# Patient Record
Sex: FEMALE | Race: WHITE | NOT HISPANIC OR LATINO | Employment: UNEMPLOYED | URBAN - METROPOLITAN AREA
[De-identification: names, ages, dates, MRNs, and addresses within clinical notes are randomized per-mention and may not be internally consistent; named-entity substitution may affect disease eponyms.]

---

## 2023-04-01 PROBLEM — N88.8 CERVICAL MASS: Status: ACTIVE | Noted: 2023-04-01

## 2023-04-01 PROBLEM — F11.20: Status: ACTIVE | Noted: 2023-04-01

## 2023-04-01 PROBLEM — F11.20 OPIOID DEPENDENCE ON MAINTENANCE AGONIST THERAPY, NO SYMPTOMS (MULTI): Status: ACTIVE | Noted: 2023-04-01

## 2023-04-01 PROBLEM — O09.529 AMA (ADVANCED MATERNAL AGE) MULTIGRAVIDA 35+ (HHS-HCC): Status: ACTIVE | Noted: 2023-04-01

## 2023-04-01 PROBLEM — F32.0 DEPRESSION, MAJOR, SINGLE EPISODE, MILD (CMS-HCC): Status: ACTIVE | Noted: 2023-04-01

## 2023-04-01 PROBLEM — R87.611 PAP SMEAR OF CERVIX WITH ASCUS, CANNOT EXCLUDE HGSIL: Status: ACTIVE | Noted: 2023-04-01

## 2023-04-01 PROBLEM — F99 INSOMNIA DUE TO OTHER MENTAL DISORDER: Status: ACTIVE | Noted: 2023-04-01

## 2023-04-01 PROBLEM — G89.18 POST-OP PAIN: Status: ACTIVE | Noted: 2023-04-01

## 2023-04-01 PROBLEM — N91.2 AMENORRHEA: Status: ACTIVE | Noted: 2023-04-01

## 2023-04-01 PROBLEM — O21.9 NAUSEA AND VOMITING IN PREGNANCY (HHS-HCC): Status: ACTIVE | Noted: 2023-04-01

## 2023-04-01 PROBLEM — O99.321: Status: ACTIVE | Noted: 2023-04-01

## 2023-04-01 PROBLEM — F17.200 NICOTINE DEPENDENCE: Status: ACTIVE | Noted: 2023-04-01

## 2023-04-01 PROBLEM — F51.05 INSOMNIA DUE TO OTHER MENTAL DISORDER: Status: ACTIVE | Noted: 2023-04-01

## 2023-04-01 PROBLEM — R87.612 LGSIL ON PAP SMEAR OF CERVIX: Status: ACTIVE | Noted: 2023-04-01

## 2023-04-01 PROBLEM — M25.50 PAIN, JOINT, MULTIPLE SITES: Status: ACTIVE | Noted: 2023-04-01

## 2023-04-01 PROBLEM — R87.810 CERVICAL HIGH RISK HPV (HUMAN PAPILLOMAVIRUS) TEST POSITIVE: Status: ACTIVE | Noted: 2023-04-01

## 2023-04-01 RX ORDER — BUPRENORPHINE HYDROCHLORIDE 8 MG/1
TABLET SUBLINGUAL
COMMUNITY
Start: 2020-04-27

## 2023-04-01 RX ORDER — ONDANSETRON 4 MG/1
4 TABLET, ORALLY DISINTEGRATING ORAL
COMMUNITY
Start: 2022-09-06 | End: 2023-04-03 | Stop reason: ALTCHOICE

## 2023-04-01 RX ORDER — BUPROPION HYDROCHLORIDE 150 MG/1
150 TABLET ORAL DAILY
COMMUNITY
Start: 2015-02-24 | End: 2023-04-02 | Stop reason: ALTCHOICE

## 2023-04-01 RX ORDER — OXYCODONE HYDROCHLORIDE 5 MG/1
5 CAPSULE ORAL
COMMUNITY
End: 2023-04-02 | Stop reason: ALTCHOICE

## 2023-04-01 RX ORDER — TIZANIDINE 2 MG/1
2 TABLET ORAL NIGHTLY
COMMUNITY
Start: 2014-06-24 | End: 2023-04-02 | Stop reason: ALTCHOICE

## 2023-04-02 PROBLEM — N88.8 CERVICAL MASS: Status: RESOLVED | Noted: 2023-04-01 | Resolved: 2023-04-02

## 2023-04-02 PROBLEM — O09.529 AMA (ADVANCED MATERNAL AGE) MULTIGRAVIDA 35+ (HHS-HCC): Status: RESOLVED | Noted: 2023-04-01 | Resolved: 2023-04-02

## 2023-04-02 PROBLEM — R87.810 CERVICAL HIGH RISK HPV (HUMAN PAPILLOMAVIRUS) TEST POSITIVE: Status: RESOLVED | Noted: 2023-04-01 | Resolved: 2023-04-02

## 2023-04-02 PROBLEM — R87.612 LGSIL ON PAP SMEAR OF CERVIX: Status: RESOLVED | Noted: 2023-04-01 | Resolved: 2023-04-02

## 2023-04-02 PROBLEM — O21.9 NAUSEA AND VOMITING IN PREGNANCY (HHS-HCC): Status: RESOLVED | Noted: 2023-04-01 | Resolved: 2023-04-02

## 2023-04-02 PROBLEM — C53.9: Status: ACTIVE | Noted: 2023-04-02

## 2023-04-02 PROBLEM — G89.18 POST-OP PAIN: Status: RESOLVED | Noted: 2023-04-01 | Resolved: 2023-04-02

## 2023-04-02 PROBLEM — R87.611 PAP SMEAR OF CERVIX WITH ASCUS, CANNOT EXCLUDE HGSIL: Status: RESOLVED | Noted: 2023-04-01 | Resolved: 2023-04-02

## 2023-04-02 RX ORDER — SERTRALINE HYDROCHLORIDE 50 MG/1
50 TABLET, FILM COATED ORAL DAILY
COMMUNITY
End: 2023-04-03 | Stop reason: ALTCHOICE

## 2023-04-03 ENCOUNTER — OFFICE VISIT (OUTPATIENT)
Dept: PRIMARY CARE | Facility: CLINIC | Age: 38
End: 2023-04-03
Payer: COMMERCIAL

## 2023-04-03 VITALS
HEART RATE: 99 BPM | WEIGHT: 175.8 LBS | DIASTOLIC BLOOD PRESSURE: 78 MMHG | TEMPERATURE: 97.8 F | HEIGHT: 65 IN | RESPIRATION RATE: 17 BRPM | SYSTOLIC BLOOD PRESSURE: 136 MMHG | BODY MASS INDEX: 29.29 KG/M2

## 2023-04-03 DIAGNOSIS — R63.5 WEIGHT GAIN: Primary | ICD-10-CM

## 2023-04-03 DIAGNOSIS — F11.20 OPIOID DEPENDENCE ON MAINTENANCE AGONIST THERAPY, NO SYMPTOMS (MULTI): ICD-10-CM

## 2023-04-03 DIAGNOSIS — F32.0 DEPRESSION, MAJOR, SINGLE EPISODE, MILD (CMS-HCC): ICD-10-CM

## 2023-04-03 DIAGNOSIS — C53.9: ICD-10-CM

## 2023-04-03 DIAGNOSIS — R53.83 OTHER FATIGUE: ICD-10-CM

## 2023-04-03 PROCEDURE — 99214 OFFICE O/P EST MOD 30 MIN: CPT | Performed by: FAMILY MEDICINE

## 2023-04-03 RX ORDER — VENLAFAXINE 75 MG/1
75 TABLET ORAL 2 TIMES DAILY
COMMUNITY
Start: 2023-03-27 | End: 2023-04-25 | Stop reason: SDUPTHER

## 2023-04-03 ASSESSMENT — ENCOUNTER SYMPTOMS
DECREASED CONCENTRATION: 1
ARTHRALGIAS: 1
POLYDIPSIA: 0
FATIGUE: 1
SHORTNESS OF BREATH: 0
ACTIVITY CHANGE: 0
DIAPHORESIS: 1
LOSS OF SENSATION IN FEET: 0
OCCASIONAL FEELINGS OF UNSTEADINESS: 0
POLYPHAGIA: 0
APPETITE CHANGE: 1
PALPITATIONS: 1
DEPRESSION: 0

## 2023-04-03 ASSESSMENT — PATIENT HEALTH QUESTIONNAIRE - PHQ9
SUM OF ALL RESPONSES TO PHQ9 QUESTIONS 1 AND 2: 2
10. IF YOU CHECKED OFF ANY PROBLEMS, HOW DIFFICULT HAVE THESE PROBLEMS MADE IT FOR YOU TO DO YOUR WORK, TAKE CARE OF THINGS AT HOME, OR GET ALONG WITH OTHER PEOPLE: SOMEWHAT DIFFICULT
1. LITTLE INTEREST OR PLEASURE IN DOING THINGS: SEVERAL DAYS
2. FEELING DOWN, DEPRESSED OR HOPELESS: SEVERAL DAYS

## 2023-04-03 ASSESSMENT — COLUMBIA-SUICIDE SEVERITY RATING SCALE - C-SSRS
1. IN THE PAST MONTH, HAVE YOU WISHED YOU WERE DEAD OR WISHED YOU COULD GO TO SLEEP AND NOT WAKE UP?: NO
2. HAVE YOU ACTUALLY HAD ANY THOUGHTS OF KILLING YOURSELF?: NO
6. HAVE YOU EVER DONE ANYTHING, STARTED TO DO ANYTHING, OR PREPARED TO DO ANYTHING TO END YOUR LIFE?: NO

## 2023-04-03 NOTE — ASSESSMENT & PLAN NOTE
Exacerbated by cancer diagnosis and miscarriage, seeing counselor. Consider medication change once labs back. No risk to self or others.

## 2023-04-03 NOTE — ASSESSMENT & PLAN NOTE
Laparotomy 11/18/2022, abdominal radical trqchelectomy, abdomnal cerclage, sentinel lymph nose disseaction and uterosacral ligament dissection. Has follow up appt 5/4 with oncology.

## 2023-04-03 NOTE — PROGRESS NOTES
Subjective   Patient ID: Marleni Syed is a 37 y.o. female who presents for Follow-up (Patient wants to discuss antidepressant medication also states that she thinks she have right toe fungus.).    On Venlafaxine 75 mg for Anxiety and major depression. Was on Sertraline for long time. Went off it because made her feel like a Zombie. Dr. Gunn started her on Venlafaxine. Has been on the same dose for a while. She has gained 30 lbs. She had a 16 week miscarriage then cancer diagnosis. She has gained about 30 lbs. She has not been able to drop her weight. She feels like she barely eats at all.     Hair is falling out and skin is drier than usual. Feels sluggish. Memory is ok. Little forgetful. Constipation.     She had surgery for cervical cancer but no chemo or homonal treatment.     She has been depressed since lost the baby, overthinking and seeing counselor. Not feeling hopeless. She is not suicidal. Worrying more than she should.            Current Outpatient Medications:     buprenorphine (Subtex) 8 mg, Take 1 tab SL 6 days per week then 3/4 1 day per week, Disp: , Rfl:     venlafaxine (Effexor) 75 mg tablet, Take 1 tablet (75 mg) by mouth in the morning and 1 tablet (75 mg) before bedtime., Disp: , Rfl:     Patient Active Problem List   Diagnosis    Generalized anxiety disorder    Depression, major, single episode, mild (CMS/HCC)    Insomnia due to other mental disorder    Nicotine dependence    Opioid dependence on maintenance agonist therapy, no symptoms (CMS/HCC)    Pain, joint, multiple sites    Squamous cell carcinoma of cervix, stage 1 (CMS/HCC)    Weight gain    Other fatigue         Review of Systems   Constitutional:  Positive for appetite change, diaphoresis and fatigue. Negative for activity change.   Respiratory:  Negative for shortness of breath.    Cardiovascular:  Positive for palpitations.   Endocrine: Positive for cold intolerance. Negative for heat intolerance, polydipsia, polyphagia and  "polyuria.   Musculoskeletal:  Positive for arthralgias.   Skin:  Negative for rash.   Psychiatric/Behavioral:  Positive for decreased concentration.        Objective   /78 (BP Location: Left arm, Patient Position: Sitting, BP Cuff Size: Adult)   Pulse 99   Temp 36.6 °C (97.8 °F)   Resp 17   Ht 1.651 m (5' 5\")   Wt 79.7 kg (175 lb 12.8 oz)   BMI 29.25 kg/m²     Physical Exam  Vitals reviewed.   Constitutional:       Appearance: Normal appearance.   Neck:      Comments: No thyromegaly.   Pulmonary:      Effort: Pulmonary effort is normal.   Skin:     General: Skin is warm and dry.   Neurological:      Mental Status: She is alert.   Psychiatric:         Mood and Affect: Mood normal.         Behavior: Behavior normal.         Thought Content: Thought content normal.         Judgment: Judgment normal.      Comments: Affect appropriate to content.          Assessment/Plan   Problem List Items Addressed This Visit          Genitourinary    Squamous cell carcinoma of cervix, stage 1 (CMS/HCC)     Laparotomy 11/18/2022, abdominal radical trqchelectomy, abdomnal cerclage, sentinel lymph nose disseaction and uterosacral ligament dissection. Has follow up appt 5/4 with oncology.             Endocrine/Metabolic    Weight gain - Primary     Etiology is unclear. Some of her symptoms are consistent with thyroid issues. Will check labs. She has some swelling in her joints of fingers. Check sed rate to screen for autoimmune disorder.             Other    Depression, major, single episode, mild (CMS/HCC)     Exacerbated by cancer diagnosis and miscarriage, seeing counselor. Consider medication change once labs back. No risk to self or others.          Opioid dependence on maintenance agonist therapy, no symptoms (CMS/HCC)     Gets MAT at [facility]. On Subutex. Currently abstinent for around 7 years.               Assessment, plans, tests, and follow up discussed with patient and patient verbalized understanding. Marleni was " given an opportunity to ask questions and  any concerns were addressed including but not limited to tests, follow up.

## 2023-04-03 NOTE — ASSESSMENT & PLAN NOTE
Etiology is unclear. Some of her symptoms are consistent with thyroid issues. Will check labs. She has some swelling in her joints of fingers. Check sed rate to screen for autoimmune disorder.

## 2023-04-14 ENCOUNTER — LAB (OUTPATIENT)
Dept: LAB | Facility: LAB | Age: 38
End: 2023-04-14
Payer: COMMERCIAL

## 2023-04-14 DIAGNOSIS — R53.83 OTHER FATIGUE: ICD-10-CM

## 2023-04-14 DIAGNOSIS — R63.5 WEIGHT GAIN: ICD-10-CM

## 2023-04-14 LAB
ALANINE AMINOTRANSFERASE (SGPT) (U/L) IN SER/PLAS: 9 U/L (ref 7–45)
ALBUMIN (G/DL) IN SER/PLAS: 4.6 G/DL (ref 3.4–5)
ALKALINE PHOSPHATASE (U/L) IN SER/PLAS: 53 U/L (ref 33–110)
ANION GAP IN SER/PLAS: 12 MMOL/L (ref 10–20)
ASPARTATE AMINOTRANSFERASE (SGOT) (U/L) IN SER/PLAS: 16 U/L (ref 9–39)
BILIRUBIN TOTAL (MG/DL) IN SER/PLAS: 0.3 MG/DL (ref 0–1.2)
CALCIUM (MG/DL) IN SER/PLAS: 9.2 MG/DL (ref 8.6–10.3)
CARBON DIOXIDE, TOTAL (MMOL/L) IN SER/PLAS: 25 MMOL/L (ref 21–32)
CHLORIDE (MMOL/L) IN SER/PLAS: 105 MMOL/L (ref 98–107)
CREATININE (MG/DL) IN SER/PLAS: 0.75 MG/DL (ref 0.5–1.05)
ERYTHROCYTE DISTRIBUTION WIDTH (RATIO) BY AUTOMATED COUNT: 16.5 % (ref 11.5–14.5)
ERYTHROCYTE MEAN CORPUSCULAR HEMOGLOBIN CONCENTRATION (G/DL) BY AUTOMATED: 29 G/DL (ref 32–36)
ERYTHROCYTE MEAN CORPUSCULAR VOLUME (FL) BY AUTOMATED COUNT: 71 FL (ref 80–100)
ERYTHROCYTES (10*6/UL) IN BLOOD BY AUTOMATED COUNT: 4.73 X10E12/L (ref 4–5.2)
GFR FEMALE: >90 ML/MIN/1.73M2
GLUCOSE (MG/DL) IN SER/PLAS: 92 MG/DL (ref 74–99)
HEMATOCRIT (%) IN BLOOD BY AUTOMATED COUNT: 33.4 % (ref 36–46)
HEMOGLOBIN (G/DL) IN BLOOD: 9.7 G/DL (ref 12–16)
LEUKOCYTES (10*3/UL) IN BLOOD BY AUTOMATED COUNT: 5.4 X10E9/L (ref 4.4–11.3)
PLATELETS (10*3/UL) IN BLOOD AUTOMATED COUNT: 338 X10E9/L (ref 150–450)
POTASSIUM (MMOL/L) IN SER/PLAS: 3.6 MMOL/L (ref 3.5–5.3)
PROTEIN TOTAL: 7.8 G/DL (ref 6.4–8.2)
SEDIMENTATION RATE, ERYTHROCYTE: 23 MM/H (ref 0–20)
SODIUM (MMOL/L) IN SER/PLAS: 138 MMOL/L (ref 136–145)
THYROTROPIN (MIU/L) IN SER/PLAS BY DETECTION LIMIT <= 0.05 MIU/L: 4.29 MIU/L (ref 0.44–3.98)
THYROXINE (T4) FREE (NG/DL) IN SER/PLAS: 0.58 NG/DL (ref 0.61–1.12)
UREA NITROGEN (MG/DL) IN SER/PLAS: 16 MG/DL (ref 6–23)

## 2023-04-14 PROCEDURE — 36415 COLL VENOUS BLD VENIPUNCTURE: CPT

## 2023-04-14 PROCEDURE — 80053 COMPREHEN METABOLIC PANEL: CPT

## 2023-04-14 PROCEDURE — 83036 HEMOGLOBIN GLYCOSYLATED A1C: CPT

## 2023-04-14 PROCEDURE — 85652 RBC SED RATE AUTOMATED: CPT

## 2023-04-14 PROCEDURE — 84439 ASSAY OF FREE THYROXINE: CPT

## 2023-04-14 PROCEDURE — 84443 ASSAY THYROID STIM HORMONE: CPT

## 2023-04-14 PROCEDURE — 85027 COMPLETE CBC AUTOMATED: CPT

## 2023-04-15 LAB
ESTIMATED AVERAGE GLUCOSE FOR HBA1C: 123 MG/DL
HEMOGLOBIN A1C/HEMOGLOBIN TOTAL IN BLOOD: 5.9 %

## 2023-04-17 ENCOUNTER — OFFICE VISIT (OUTPATIENT)
Dept: PRIMARY CARE | Facility: CLINIC | Age: 38
End: 2023-04-17
Payer: COMMERCIAL

## 2023-04-17 VITALS
HEART RATE: 99 BPM | BODY MASS INDEX: 29.76 KG/M2 | DIASTOLIC BLOOD PRESSURE: 75 MMHG | RESPIRATION RATE: 20 BRPM | WEIGHT: 178.6 LBS | HEIGHT: 65 IN | TEMPERATURE: 97.9 F | SYSTOLIC BLOOD PRESSURE: 115 MMHG | OXYGEN SATURATION: 98 %

## 2023-04-17 DIAGNOSIS — R53.83 OTHER FATIGUE: ICD-10-CM

## 2023-04-17 DIAGNOSIS — M25.50 PAIN, JOINT, MULTIPLE SITES: ICD-10-CM

## 2023-04-17 DIAGNOSIS — R73.09 ELEVATED HEMOGLOBIN A1C: ICD-10-CM

## 2023-04-17 DIAGNOSIS — E03.9 ACQUIRED HYPOTHYROIDISM: Primary | ICD-10-CM

## 2023-04-17 DIAGNOSIS — F32.0 DEPRESSION, MAJOR, SINGLE EPISODE, MILD (CMS-HCC): ICD-10-CM

## 2023-04-17 DIAGNOSIS — F41.1 GENERALIZED ANXIETY DISORDER: ICD-10-CM

## 2023-04-17 DIAGNOSIS — R63.5 WEIGHT GAIN: ICD-10-CM

## 2023-04-17 PROCEDURE — 99214 OFFICE O/P EST MOD 30 MIN: CPT | Performed by: FAMILY MEDICINE

## 2023-04-17 RX ORDER — LEVOTHYROXINE SODIUM 50 UG/1
50 TABLET ORAL
Qty: 30 TABLET | Refills: 11 | Status: SHIPPED | OUTPATIENT
Start: 2023-04-17 | End: 2023-07-13 | Stop reason: SDUPTHER

## 2023-04-17 ASSESSMENT — ENCOUNTER SYMPTOMS
CONFUSION: 0
UNEXPECTED WEIGHT CHANGE: 0
HEADACHES: 0
PALPITATIONS: 0
COUGH: 0
SHORTNESS OF BREATH: 0

## 2023-04-17 NOTE — ASSESSMENT & PLAN NOTE
A1C 5.9, prior 5.4, She had normal glucose. Does not meet criteria for Prediabetes. Repeat in 3-6 months to evaluate.

## 2023-04-17 NOTE — ASSESSMENT & PLAN NOTE
TSH elevated, T4 low. ESR elevated slightly. Suspect she may have Hashimotos. Will check antibodies when recheck TFTs on her Levothyroxine in 6 weeks.

## 2023-04-17 NOTE — PROGRESS NOTES
"Subjective   Patient ID: Marleni Syed is a 37 y.o. female who presents for Follow-up (2 week check and review labs).    Here for 4 week follow up     Having issues with Venlafaxine not working as well. On Venlafaxine ER.  She was feeling tired, achey.     Had sugar checked due to thirst. She has not had abnormal sugars.     She had surgery for cervical cancer. Had some transfusions. Hgfb was as low as 7.4 then up to 8.2. Repeated it an 9.7 with low MCV and MCHC. Has not been on iron.            Current Outpatient Medications:     buprenorphine (Subtex) 8 mg, Take 1 tab SL 6 days per week then 3/4 1 day per week, Disp: , Rfl:     venlafaxine (Effexor) 75 mg tablet, Take 1 tablet (75 mg) by mouth in the morning and 1 tablet (75 mg) before bedtime., Disp: , Rfl:     Patient Active Problem List   Diagnosis    Generalized anxiety disorder    Depression, major, single episode, mild (CMS/HCC)    Insomnia due to other mental disorder    Nicotine dependence    Opioid dependence on maintenance agonist therapy, no symptoms (CMS/HCC)    Pain, joint, multiple sites    Squamous cell carcinoma of cervix, stage 1 (CMS/HCC)    Weight gain    Other fatigue    Elevated hemoglobin A1c    Acquired hypothyroidism         Review of Systems   Constitutional:  Negative for unexpected weight change.   Respiratory:  Negative for cough and shortness of breath.    Cardiovascular:  Negative for chest pain, palpitations and leg swelling.   Skin:  Negative for rash.   Neurological:  Negative for headaches.   Psychiatric/Behavioral:  Negative for confusion.        Objective   /75 (BP Location: Right arm, Patient Position: Sitting)   Pulse 99   Temp 36.6 °C (97.9 °F)   Resp 20   Ht 1.651 m (5' 5\")   Wt 81 kg (178 lb 9.6 oz)   SpO2 98%   BMI 29.72 kg/m²     Physical Exam  Vitals reviewed.   Constitutional:       Appearance: Normal appearance.   Pulmonary:      Effort: Pulmonary effort is normal.   Neurological:      Mental Status: She " is alert.   Psychiatric:         Mood and Affect: Mood normal.         Behavior: Behavior normal.       Recent Results (from the past 672 hour(s))   CBC    Collection Time: 04/14/23  4:02 PM   Result Value Ref Range    WBC 5.4 4.4 - 11.3 x10E9/L    RBC 4.73 4.00 - 5.20 x10E12/L    Hemoglobin 9.7 (L) 12.0 - 16.0 g/dL    Hematocrit 33.4 (L) 36.0 - 46.0 %    MCV 71 (L) 80 - 100 fL    MCHC 29.0 (L) 32.0 - 36.0 g/dL    Platelets 338 150 - 450 x10E9/L    RDW 16.5 (H) 11.5 - 14.5 %   Comprehensive Metabolic Panel    Collection Time: 04/14/23  4:02 PM   Result Value Ref Range    Glucose 92 74 - 99 mg/dL    Sodium 138 136 - 145 mmol/L    Potassium 3.6 3.5 - 5.3 mmol/L    Chloride 105 98 - 107 mmol/L    Bicarbonate 25 21 - 32 mmol/L    Anion Gap 12 10 - 20 mmol/L    Urea Nitrogen 16 6 - 23 mg/dL    Creatinine 0.75 0.50 - 1.05 mg/dL    GFR Female >90 >90 mL/min/1.73m2    Calcium 9.2 8.6 - 10.3 mg/dL    Albumin 4.6 3.4 - 5.0 g/dL    Alkaline Phosphatase 53 33 - 110 U/L    Total Protein 7.8 6.4 - 8.2 g/dL    AST 16 9 - 39 U/L    Total Bilirubin 0.3 0.0 - 1.2 mg/dL    ALT (SGPT) 9 7 - 45 U/L   Hemoglobin A1C    Collection Time: 04/14/23  4:02 PM   Result Value Ref Range    Hemoglobin A1C 5.9 (A) %    Estimated Average Glucose 123 MG/DL   Sedimentation Rate    Collection Time: 04/14/23  4:02 PM   Result Value Ref Range    Sedimentation Rate 23 (H) 0 - 20 mm/h   Thyroxine, Free    Collection Time: 04/14/23  4:02 PM   Result Value Ref Range    Free T4 0.58 (L) 0.61 - 1.12 ng/dL   Thyroid Stimulating Hormone    Collection Time: 04/14/23  4:02 PM   Result Value Ref Range    TSH 4.29 (H) 0.44 - 3.98 mIU/L         Assessment/Plan   Problem List Items Addressed This Visit          Musculoskeletal    Pain, joint, multiple sites     Will treat thyroid then will reassess and work up if not improved.             Endocrine/Metabolic    Weight gain     Likely due to thyroid issues.          Acquired hypothyroidism     TSH elevated, T4 low. ESR  elevated slightly. Suspect she may have Hashimotos. Will check antibodies when recheck TFTs on her Levothyroxine in 6 weeks.             Hematologic    Elevated hemoglobin A1c     A1C 5.9, prior 5.4, She had normal glucose. Does not meet criteria for Prediabetes. Repeat in 3-6 months to evaluate.            Other    Generalized anxiety disorder     Will wait to address until euthyroid.          Depression, major, single episode, mild (CMS/HCC)     Sx have been worse on same dose but is hypothyroid. Will add Levothyroxine and titrate then readdress. No SI>          Other fatigue - Primary     Likely due to Hypothyroidism. Follow up after on Levothyroxine.               Assessment, plans, tests, and follow up discussed with patient and patient verbalized understanding. Marleni was given an opportunity to ask questions and  any concerns were addressed including but not limited to medication changes, diagnosis, and follow up plans. .

## 2023-04-17 NOTE — ASSESSMENT & PLAN NOTE
Sx have been worse on same dose but is hypothyroid. Will add Levothyroxine and titrate then readdress. No SI>

## 2023-04-25 ENCOUNTER — TELEPHONE (OUTPATIENT)
Dept: PRIMARY CARE | Facility: CLINIC | Age: 38
End: 2023-04-25

## 2023-04-25 DIAGNOSIS — F32.0 DEPRESSION, MAJOR, SINGLE EPISODE, MILD (CMS-HCC): Primary | ICD-10-CM

## 2023-04-25 NOTE — TELEPHONE ENCOUNTER
Refill:  Venlafaxine 75 MG taken twice a day     Pervious Visit:4/17/23    Future Visit: 5/17/23    Pharmacy: [facility]

## 2023-04-26 RX ORDER — VENLAFAXINE 75 MG/1
75 TABLET ORAL 2 TIMES DAILY
Qty: 180 TABLET | Refills: 0 | Status: SHIPPED | OUTPATIENT
Start: 2023-04-26 | End: 2023-07-13 | Stop reason: DRUGHIGH

## 2023-05-17 ENCOUNTER — TELEPHONE (OUTPATIENT)
Dept: PRIMARY CARE | Facility: CLINIC | Age: 38
End: 2023-05-17

## 2023-05-17 ENCOUNTER — APPOINTMENT (OUTPATIENT)
Dept: PRIMARY CARE | Facility: CLINIC | Age: 38
End: 2023-05-17

## 2023-05-17 NOTE — TELEPHONE ENCOUNTER
Patient called in and wanted to let you know that the levothyroxine so far has not been helping her  thyroid issues and is asking for advice on what she needs to do before her appointment on the first.

## 2023-05-31 PROBLEM — D50.0 ANEMIA DUE TO BLOOD LOSS: Status: ACTIVE | Noted: 2023-05-31

## 2023-05-31 ASSESSMENT — ENCOUNTER SYMPTOMS
NERVOUS/ANXIOUS: 0
VOMITING: 0
DYSPHORIC MOOD: 0
DIFFICULTY URINATING: 0
SHORTNESS OF BREATH: 0
DIARRHEA: 0
POLYPHAGIA: 0
JOINT SWELLING: 0
HEADACHES: 0
ACTIVITY CHANGE: 0
WHEEZING: 0
CONSTIPATION: 0
TROUBLE SWALLOWING: 0
CONFUSION: 0
SEIZURES: 0
ARTHRALGIAS: 0
ABDOMINAL PAIN: 0
POLYDIPSIA: 0
COUGH: 0
APPETITE CHANGE: 0
BLOOD IN STOOL: 0
UNEXPECTED WEIGHT CHANGE: 0
PALPITATIONS: 0
NAUSEA: 0

## 2023-06-01 ENCOUNTER — LAB (OUTPATIENT)
Dept: LAB | Facility: LAB | Age: 38
End: 2023-06-01
Payer: COMMERCIAL

## 2023-06-01 ENCOUNTER — OFFICE VISIT (OUTPATIENT)
Dept: PRIMARY CARE | Facility: CLINIC | Age: 38
End: 2023-06-01
Payer: COMMERCIAL

## 2023-06-01 VITALS
DIASTOLIC BLOOD PRESSURE: 73 MMHG | HEIGHT: 65 IN | BODY MASS INDEX: 28.69 KG/M2 | HEART RATE: 70 BPM | OXYGEN SATURATION: 98 % | WEIGHT: 172.2 LBS | TEMPERATURE: 97.3 F | SYSTOLIC BLOOD PRESSURE: 108 MMHG

## 2023-06-01 DIAGNOSIS — Z13.220 SCREENING, LIPID: ICD-10-CM

## 2023-06-01 DIAGNOSIS — E03.9 ACQUIRED HYPOTHYROIDISM: ICD-10-CM

## 2023-06-01 DIAGNOSIS — M25.50 PAIN, JOINT, MULTIPLE SITES: ICD-10-CM

## 2023-06-01 DIAGNOSIS — Z00.00 WELL ADULT EXAM: Primary | ICD-10-CM

## 2023-06-01 DIAGNOSIS — R73.09 ELEVATED HEMOGLOBIN A1C: ICD-10-CM

## 2023-06-01 DIAGNOSIS — F32.0 DEPRESSION, MAJOR, SINGLE EPISODE, MILD (CMS-HCC): ICD-10-CM

## 2023-06-01 DIAGNOSIS — D50.0 ANEMIA DUE TO BLOOD LOSS: ICD-10-CM

## 2023-06-01 DIAGNOSIS — C53.9: ICD-10-CM

## 2023-06-01 DIAGNOSIS — Z23 NEED FOR VACCINATION: ICD-10-CM

## 2023-06-01 DIAGNOSIS — F17.299 OTHER TOBACCO PRODUCT NICOTINE DEPENDENCE WITH NICOTINE-INDUCED DISORDER: ICD-10-CM

## 2023-06-01 DIAGNOSIS — E16.2 HYPOGLYCEMIA: ICD-10-CM

## 2023-06-01 DIAGNOSIS — F41.1 GENERALIZED ANXIETY DISORDER: ICD-10-CM

## 2023-06-01 LAB
ANION GAP IN SER/PLAS: 13 MMOL/L (ref 10–20)
BASOPHILS (10*3/UL) IN BLOOD BY AUTOMATED COUNT: 0.05 X10E9/L (ref 0–0.1)
BASOPHILS/100 LEUKOCYTES IN BLOOD BY AUTOMATED COUNT: 1.3 % (ref 0–2)
CALCIUM (MG/DL) IN SER/PLAS: 9.6 MG/DL (ref 8.6–10.3)
CARBON DIOXIDE, TOTAL (MMOL/L) IN SER/PLAS: 27 MMOL/L (ref 21–32)
CHLORIDE (MMOL/L) IN SER/PLAS: 103 MMOL/L (ref 98–107)
CHOLESTEROL (MG/DL) IN SER/PLAS: 163 MG/DL (ref 0–199)
CHOLESTEROL IN HDL (MG/DL) IN SER/PLAS: 44.9 MG/DL
CHOLESTEROL/HDL RATIO: 3.6
CREATININE (MG/DL) IN SER/PLAS: 0.69 MG/DL (ref 0.5–1.05)
EOSINOPHILS (10*3/UL) IN BLOOD BY AUTOMATED COUNT: 0.11 X10E9/L (ref 0–0.7)
EOSINOPHILS/100 LEUKOCYTES IN BLOOD BY AUTOMATED COUNT: 2.8 % (ref 0–6)
ERYTHROCYTE DISTRIBUTION WIDTH (RATIO) BY AUTOMATED COUNT: 18.6 % (ref 11.5–14.5)
ERYTHROCYTE MEAN CORPUSCULAR HEMOGLOBIN CONCENTRATION (G/DL) BY AUTOMATED: 28.5 G/DL (ref 32–36)
ERYTHROCYTE MEAN CORPUSCULAR VOLUME (FL) BY AUTOMATED COUNT: 74 FL (ref 80–100)
ERYTHROCYTES (10*6/UL) IN BLOOD BY AUTOMATED COUNT: 4.48 X10E12/L (ref 4–5.2)
ESTIMATED AVERAGE GLUCOSE FOR HBA1C: 123 MG/DL
FERRITIN (UG/LL) IN SER/PLAS: 15 UG/L (ref 8–150)
GFR FEMALE: >90 ML/MIN/1.73M2
GLUCOSE (MG/DL) IN SER/PLAS: 52 MG/DL (ref 74–99)
HEMATOCRIT (%) IN BLOOD BY AUTOMATED COUNT: 33.3 % (ref 36–46)
HEMOGLOBIN (G/DL) IN BLOOD: 9.5 G/DL (ref 12–16)
HEMOGLOBIN A1C/HEMOGLOBIN TOTAL IN BLOOD: 5.9 %
IMMATURE GRANULOCYTES/100 LEUKOCYTES IN BLOOD BY AUTOMATED COUNT: 0.3 % (ref 0–0.9)
IRON (UG/DL) IN SER/PLAS: 39 UG/DL (ref 35–150)
IRON BINDING CAPACITY (UG/DL) IN SER/PLAS: 435 UG/DL (ref 240–445)
IRON SATURATION (%) IN SER/PLAS: 9 % (ref 25–45)
LDL: 99 MG/DL (ref 0–99)
LEUKOCYTES (10*3/UL) IN BLOOD BY AUTOMATED COUNT: 3.9 X10E9/L (ref 4.4–11.3)
LYMPHOCYTES (10*3/UL) IN BLOOD BY AUTOMATED COUNT: 1.02 X10E9/L (ref 1.2–4.8)
LYMPHOCYTES/100 LEUKOCYTES IN BLOOD BY AUTOMATED COUNT: 25.9 % (ref 13–44)
MONOCYTES (10*3/UL) IN BLOOD BY AUTOMATED COUNT: 0.3 X10E9/L (ref 0.1–1)
MONOCYTES/100 LEUKOCYTES IN BLOOD BY AUTOMATED COUNT: 7.6 % (ref 2–10)
NEUTROPHILS (10*3/UL) IN BLOOD BY AUTOMATED COUNT: 2.45 X10E9/L (ref 1.2–7.7)
NEUTROPHILS/100 LEUKOCYTES IN BLOOD BY AUTOMATED COUNT: 62.1 % (ref 40–80)
PLATELETS (10*3/UL) IN BLOOD AUTOMATED COUNT: 440 X10E9/L (ref 150–450)
POTASSIUM (MMOL/L) IN SER/PLAS: 4.3 MMOL/L (ref 3.5–5.3)
SODIUM (MMOL/L) IN SER/PLAS: 139 MMOL/L (ref 136–145)
THYROPEROXIDASE AB (IU/ML) IN SER/PLAS: <28 IU/ML
THYROTROPIN (MIU/L) IN SER/PLAS BY DETECTION LIMIT <= 0.05 MIU/L: 0.93 MIU/L (ref 0.44–3.98)
THYROXINE (T4) FREE (NG/DL) IN SER/PLAS: 0.79 NG/DL (ref 0.61–1.12)
TRIGLYCERIDE (MG/DL) IN SER/PLAS: 96 MG/DL (ref 0–149)
UREA NITROGEN (MG/DL) IN SER/PLAS: 18 MG/DL (ref 6–23)
VLDL: 19 MG/DL (ref 0–40)

## 2023-06-01 PROCEDURE — 83036 HEMOGLOBIN GLYCOSYLATED A1C: CPT

## 2023-06-01 PROCEDURE — 99214 OFFICE O/P EST MOD 30 MIN: CPT | Performed by: FAMILY MEDICINE

## 2023-06-01 PROCEDURE — 85025 COMPLETE CBC W/AUTO DIFF WBC: CPT

## 2023-06-01 PROCEDURE — 83540 ASSAY OF IRON: CPT

## 2023-06-01 PROCEDURE — 84443 ASSAY THYROID STIM HORMONE: CPT

## 2023-06-01 PROCEDURE — 86376 MICROSOMAL ANTIBODY EACH: CPT

## 2023-06-01 PROCEDURE — 86800 THYROGLOBULIN ANTIBODY: CPT

## 2023-06-01 PROCEDURE — 80048 BASIC METABOLIC PNL TOTAL CA: CPT

## 2023-06-01 PROCEDURE — 80061 LIPID PANEL: CPT

## 2023-06-01 PROCEDURE — 84439 ASSAY OF FREE THYROXINE: CPT

## 2023-06-01 PROCEDURE — 99395 PREV VISIT EST AGE 18-39: CPT | Performed by: FAMILY MEDICINE

## 2023-06-01 PROCEDURE — 36415 COLL VENOUS BLD VENIPUNCTURE: CPT

## 2023-06-01 PROCEDURE — 84432 ASSAY OF THYROGLOBULIN: CPT

## 2023-06-01 PROCEDURE — 83550 IRON BINDING TEST: CPT

## 2023-06-01 PROCEDURE — 82728 ASSAY OF FERRITIN: CPT

## 2023-06-01 ASSESSMENT — ENCOUNTER SYMPTOMS
FATIGUE: 1
BACK PAIN: 1

## 2023-06-01 NOTE — ASSESSMENT & PLAN NOTE
Currently pain mostly in upper back, worse with being up for long time. She is checking with insurance to see if can go to ortho walk-in clinic or if she need referrral.

## 2023-06-01 NOTE — ASSESSMENT & PLAN NOTE
Hgb 7.2 after surgery for cervical cancer. Up to 9.7 April 2023. Resumed iron. Repeat done today.Down to 9.5. Increase iron to 2 daily.  She will follow up in 4 weeks.
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
## 2023-06-01 NOTE — ASSESSMENT & PLAN NOTE
Started on Levothyroxine 50 mcg daily in April. Due for repeat labs with thyroid antibodies. She just got them drawn today. TSH improved and T4 now low end of normal. Follow up one month to see if symptoms improve with iron or if need to reasses. Antibodies pending.

## 2023-06-01 NOTE — PATIENT INSTRUCTIONS
Will check on labs but need to follow up in week or two to address them. Schedule follow up in 1-2 weeks.     Continue current medication.     Take your Venlafaxine one twice a day. We may need to switch you to extended release.     Increase iron to 2 daily    Watch unnecessary carbohydrates.     Follow up in one months. Want to see how you are doing with taking iron twice a day and if that will impact your symptoms.     Sugar is low at 52. Need to check serum insulin level.

## 2023-06-01 NOTE — LETTER
June 1, 2023     Patient: Marleni Syed   YOB: 1985   Date of Visit: 6/1/2023       To Whom It May Concern:    Marleni Syed was seen in my clinic on 6/1/2023 at 4:00 pm. Please excuse Marleni for her absence from work on this day to make the appointment.    If you have any questions or concerns, please don't hesitate to call.         Sincerely,         Leonie Bhakta MD        CC: No Recipients

## 2023-06-01 NOTE — ASSESSMENT & PLAN NOTE
On Venlafaxine 75 mg bid. Was to see if improved once euthyroid. Lab drawn today and results not here.

## 2023-06-05 LAB
THYROGLOBULIN AB (IU/ML) IN SER/PLAS: <0.9 IU/ML (ref 0–4)
THYROGLOBULIN LC-MS/MS: NORMAL NG/ML (ref 1.3–31.8)
THYROGLOBULIN: 12.4 NG/ML (ref 1.3–31.8)

## 2023-06-27 ENCOUNTER — HOSPITAL ENCOUNTER (OUTPATIENT)
Dept: DATA CONVERSION | Facility: HOSPITAL | Age: 38
End: 2023-06-27
Attending: STUDENT IN AN ORGANIZED HEALTH CARE EDUCATION/TRAINING PROGRAM | Admitting: STUDENT IN AN ORGANIZED HEALTH CARE EDUCATION/TRAINING PROGRAM

## 2023-06-27 DIAGNOSIS — F17.290 NICOTINE DEPENDENCE, OTHER TOBACCO PRODUCT, UNCOMPLICATED: ICD-10-CM

## 2023-06-27 DIAGNOSIS — F11.10 OPIOID ABUSE, UNCOMPLICATED (MULTI): ICD-10-CM

## 2023-06-27 DIAGNOSIS — F41.9 ANXIETY DISORDER, UNSPECIFIED: ICD-10-CM

## 2023-06-27 DIAGNOSIS — F32.A DEPRESSION, UNSPECIFIED: ICD-10-CM

## 2023-06-27 DIAGNOSIS — K21.9 GASTRO-ESOPHAGEAL REFLUX DISEASE WITHOUT ESOPHAGITIS: ICD-10-CM

## 2023-06-27 DIAGNOSIS — G40.909 EPILEPSY, UNSPECIFIED, NOT INTRACTABLE, WITHOUT STATUS EPILEPTICUS (MULTI): ICD-10-CM

## 2023-06-27 DIAGNOSIS — C53.9 MALIGNANT NEOPLASM OF CERVIX UTERI, UNSPECIFIED (MULTI): ICD-10-CM

## 2023-07-02 PROBLEM — E16.2 HYPOGLYCEMIA: Status: RESOLVED | Noted: 2023-06-01 | Resolved: 2023-07-02

## 2023-07-02 ASSESSMENT — ENCOUNTER SYMPTOMS
ARTHRALGIAS: 0
FATIGUE: 0
HEADACHES: 0
PALPITATIONS: 0
POLYPHAGIA: 0
DIARRHEA: 0
MYALGIAS: 0
NAUSEA: 0
CONSTIPATION: 0
TROUBLE SWALLOWING: 0
DIZZINESS: 0
SHORTNESS OF BREATH: 0
POLYDIPSIA: 0
COUGH: 0
BRUISES/BLEEDS EASILY: 0
UNEXPECTED WEIGHT CHANGE: 0
APPETITE CHANGE: 0

## 2023-07-03 ENCOUNTER — APPOINTMENT (OUTPATIENT)
Dept: PRIMARY CARE | Facility: CLINIC | Age: 38
End: 2023-07-03

## 2023-07-03 NOTE — ASSESSMENT & PLAN NOTE
Hgb 9.5, was 9.7 last. Avel 7.2 after surgery. Microcytosis with hypochromia and low iron levels with sat of 9%. Iron bid and repeat in 3 months.

## 2023-07-03 NOTE — ASSESSMENT & PLAN NOTE
A1C persists at 5.9. Discussed possible use of Metformin. Role of diet, exercise and 7% weight loss discussed with family

## 2023-07-03 NOTE — PROGRESS NOTES
Subjective   Patient ID: Marleni Syed is a 38 y.o. female who presents for No chief complaint on file..    Here for 4 wk follow up on Thyroid, anemia, hypoglycemia. Labs done in interim.     Hypothyroid - started on Levothyroxine 50 mcg daily in April. Follow up labs done. No symptoms. Feeling some better    Aneima due to blood loss - after trachelectomy for cervical cancer. She had irons tudies done. Still with some fatigue but improving. Had repeat MRI last week which showed no residual disease.     Elevated A1C - prior was 5.9. Repeat lab done. No symptoms of diabetes.     SREEKANTH - on Venlafaxine 75 mg bid. Was having some symptoms still and she was to follow up once eutthyroid to discuss medication.           Current Outpatient Medications:     buprenorphine (Subtex) 8 mg, Take 1 tab SL 6 days per week then 3/4 1 day per week, Disp: , Rfl:     levothyroxine (Synthroid, Levoxyl) 50 mcg tablet, Take 1 tablet (50 mcg) by mouth once daily in the morning. Take before meals., Disp: 30 tablet, Rfl: 11    venlafaxine (Effexor) 75 mg tablet, Take 1 tablet (75 mg) by mouth in the morning and 1 tablet (75 mg) before bedtime., Disp: 180 tablet, Rfl: 0    Patient Active Problem List   Diagnosis    Generalized anxiety disorder    Depression, major, single episode, mild (CMS/HCC)    Insomnia due to other mental disorder    Nicotine dependence    Opioid dependence on maintenance agonist therapy, no symptoms (CMS/HCC)    Pain, joint, multiple sites    Squamous cell carcinoma of cervix, stage 1 (CMS/HCC)    Weight gain    Other fatigue    Elevated hemoglobin A1c    Acquired hypothyroidism    Anemia due to blood loss         Review of Systems   Constitutional:  Negative for appetite change, fatigue and unexpected weight change.   HENT:  Negative for trouble swallowing.    Eyes:  Negative for visual disturbance.   Respiratory:  Negative for cough and shortness of breath.    Cardiovascular:  Negative for chest pain, palpitations and  leg swelling.   Gastrointestinal:  Negative for constipation, diarrhea and nausea.   Endocrine: Negative for cold intolerance, heat intolerance, polydipsia, polyphagia and polyuria.   Musculoskeletal:  Negative for arthralgias and myalgias.   Skin:  Negative for rash.   Neurological:  Negative for dizziness and headaches.   Hematological:  Does not bruise/bleed easily.       Objective   There were no vitals taken for this visit.    Physical Exam  Vitals reviewed.   Constitutional:       Appearance: Normal appearance.   Pulmonary:      Effort: Pulmonary effort is normal.   Neurological:      Mental Status: She is alert.   Psychiatric:         Mood and Affect: Mood normal.         Behavior: Behavior normal.       Recent Results (from the past 1008 hour(s))   Thyroid Peroxidase (TPO) Antibody    Collection Time: 06/01/23  1:10 PM   Result Value Ref Range    Antithyroid Peroxidase Ab <28 IU/mL   Thyroglobulin and Antithyroglobulin    Collection Time: 06/01/23  1:10 PM   Result Value Ref Range    Thyroglobulin 12.4 1.3 - 31.8 ng/mL    Thyroglobulin LC-MS/MS Not Applicable 1.3 - 31.8 ng/mL    Anti-Thyroglobulin AB <0.9 0.0 - 4.0 IU/mL   Thyroxine, Free    Collection Time: 06/01/23  1:10 PM   Result Value Ref Range    Free T4 0.79 0.61 - 1.12 ng/dL   Thyroid Stimulating Hormone    Collection Time: 06/01/23  1:10 PM   Result Value Ref Range    TSH 0.93 0.44 - 3.98 mIU/L   CBC and Auto Differential    Collection Time: 06/01/23  1:12 PM   Result Value Ref Range    WBC 3.9 (L) 4.4 - 11.3 x10E9/L    RBC 4.48 4.00 - 5.20 x10E12/L    Hemoglobin 9.5 (L) 12.0 - 16.0 g/dL    Hematocrit 33.3 (L) 36.0 - 46.0 %    MCV 74 (L) 80 - 100 fL    MCHC 28.5 (L) 32.0 - 36.0 g/dL    Platelets 440 150 - 450 x10E9/L    RDW 18.6 (H) 11.5 - 14.5 %    Neutrophils % 62.1 40.0 - 80.0 %    Immature Granulocytes %, Automated 0.3 0.0 - 0.9 %    Lymphocytes % 25.9 13.0 - 44.0 %    Monocytes % 7.6 2.0 - 10.0 %    Eosinophils % 2.8 0.0 - 6.0 %    Basophils %  1.3 0.0 - 2.0 %    Neutrophils Absolute 2.45 1.20 - 7.70 x10E9/L    Lymphocytes Absolute 1.02 (L) 1.20 - 4.80 x10E9/L    Monocytes Absolute 0.30 0.10 - 1.00 x10E9/L    Eosinophils Absolute 0.11 0.00 - 0.70 x10E9/L    Basophils Absolute 0.05 0.00 - 0.10 x10E9/L   Hemoglobin A1C    Collection Time: 06/01/23  1:12 PM   Result Value Ref Range    Hemoglobin A1C 5.9 (A) %    Estimated Average Glucose 123 MG/DL   Basic Metabolic Panel    Collection Time: 06/01/23  1:12 PM   Result Value Ref Range    Glucose 52 (L) 74 - 99 mg/dL    Sodium 139 136 - 145 mmol/L    Potassium 4.3 3.5 - 5.3 mmol/L    Chloride 103 98 - 107 mmol/L    Bicarbonate 27 21 - 32 mmol/L    Anion Gap 13 10 - 20 mmol/L    Urea Nitrogen 18 6 - 23 mg/dL    Creatinine 0.69 0.50 - 1.05 mg/dL    GFR Female >90 >90 mL/min/1.73m2    Calcium 9.6 8.6 - 10.3 mg/dL   Lipid Panel    Collection Time: 06/01/23  1:12 PM   Result Value Ref Range    Cholesterol 163 0 - 199 mg/dL    HDL 44.9 mg/dL    Cholesterol/HDL Ratio 3.6     LDL 99 0 - 99 mg/dL    VLDL 19 0 - 40 mg/dL    Triglycerides 96 0 - 149 mg/dL   Iron and TIBC    Collection Time: 06/01/23  1:12 PM   Result Value Ref Range    Iron 39 35 - 150 ug/dL    TIBC 435 240 - 445 ug/dL    Iron Saturation 9 (L) 25 - 45 %   Ferritin    Collection Time: 06/01/23  1:12 PM   Result Value Ref Range    Ferritin 15 8 - 150 ug/L           Assessment/Plan   Problem List Items Addressed This Visit          Endocrine/Metabolic    Elevated hemoglobin A1c - Primary     A1C persists at 5.9. Discussed possible use of Metformin. Role of diet, exercise and 7% weight loss discussed with family         Acquired hypothyroidism     TSH improved on medication. Recheck 6 months. Continue current dose.             Hematology and Neoplasia    Anemia due to blood loss     Hgb 9.5, was 9.7 last. Avel 7.2 after surgery. Microcytosis with hypochromia and low iron levels with sat of 9%. Iron bid and repeat in 3 months.             Mental Health     Generalized anxiety disorder     Here to reassess mood after euthyroid. On Venlafaxine 75 mg bid.               Assessment, plans, tests, and follow up discussed with patient and patient verbalized understanding. Marleni was given an opportunity to ask questions and  any concerns were addressed including but not limited to ***.

## 2023-07-13 ENCOUNTER — APPOINTMENT (OUTPATIENT)
Dept: LAB | Facility: LAB | Age: 38
End: 2023-07-13
Payer: COMMERCIAL

## 2023-07-13 ENCOUNTER — OFFICE VISIT (OUTPATIENT)
Dept: PRIMARY CARE | Facility: CLINIC | Age: 38
End: 2023-07-13
Payer: COMMERCIAL

## 2023-07-13 VITALS
BODY MASS INDEX: 26.52 KG/M2 | TEMPERATURE: 97.7 F | SYSTOLIC BLOOD PRESSURE: 112 MMHG | HEIGHT: 65 IN | HEART RATE: 79 BPM | WEIGHT: 159.2 LBS | OXYGEN SATURATION: 100 % | DIASTOLIC BLOOD PRESSURE: 75 MMHG

## 2023-07-13 DIAGNOSIS — F51.05 INSOMNIA DUE TO OTHER MENTAL DISORDER: Primary | ICD-10-CM

## 2023-07-13 DIAGNOSIS — D50.0 ANEMIA DUE TO BLOOD LOSS: ICD-10-CM

## 2023-07-13 DIAGNOSIS — E03.9 ACQUIRED HYPOTHYROIDISM: ICD-10-CM

## 2023-07-13 DIAGNOSIS — F32.0 DEPRESSION, MAJOR, SINGLE EPISODE, MILD (CMS-HCC): ICD-10-CM

## 2023-07-13 DIAGNOSIS — R73.09 ELEVATED HEMOGLOBIN A1C: ICD-10-CM

## 2023-07-13 DIAGNOSIS — F99 INSOMNIA DUE TO OTHER MENTAL DISORDER: Primary | ICD-10-CM

## 2023-07-13 DIAGNOSIS — R53.83 OTHER FATIGUE: ICD-10-CM

## 2023-07-13 PROCEDURE — 99214 OFFICE O/P EST MOD 30 MIN: CPT | Performed by: FAMILY MEDICINE

## 2023-07-13 RX ORDER — VENLAFAXINE HYDROCHLORIDE 150 MG/1
150 CAPSULE, EXTENDED RELEASE ORAL DAILY
Qty: 30 CAPSULE | Refills: 1 | Status: SHIPPED | OUTPATIENT
Start: 2023-07-13 | End: 2023-09-27 | Stop reason: SDUPTHER

## 2023-07-13 RX ORDER — LEVOTHYROXINE SODIUM 50 UG/1
50 TABLET ORAL
Qty: 90 TABLET | Refills: 3 | Status: SHIPPED | OUTPATIENT
Start: 2023-07-13 | End: 2024-04-25 | Stop reason: SDUPTHER

## 2023-07-13 RX ORDER — MIRTAZAPINE 15 MG/1
15 TABLET, FILM COATED ORAL NIGHTLY
Qty: 30 TABLET | Refills: 2 | Status: SHIPPED | OUTPATIENT
Start: 2023-07-13 | End: 2024-04-25

## 2023-07-13 RX ORDER — VENLAFAXINE 75 MG/1
75 TABLET ORAL 2 TIMES DAILY
Qty: 180 TABLET | Refills: 0 | Status: CANCELLED | OUTPATIENT
Start: 2023-07-13

## 2023-07-13 ASSESSMENT — ENCOUNTER SYMPTOMS
HEADACHES: 0
COUGH: 0
SHORTNESS OF BREATH: 0
UNEXPECTED WEIGHT CHANGE: 0
CONFUSION: 0
PALPITATIONS: 0

## 2023-07-13 ASSESSMENT — PATIENT HEALTH QUESTIONNAIRE - PHQ9
2. FEELING DOWN, DEPRESSED OR HOPELESS: NOT AT ALL
SUM OF ALL RESPONSES TO PHQ9 QUESTIONS 1 AND 2: 0
1. LITTLE INTEREST OR PLEASURE IN DOING THINGS: NOT AT ALL

## 2023-07-13 NOTE — LETTER
July 13, 2023     Patient: Marleni Syed   YOB: 1985   Date of Visit: 7/13/2023       To Whom It May Concern:    Marleni Syed was seen in my clinic on 7/13/2023 at 4:30 pm. Please excuse Marleni for her absence from work on this day to make the appointment.    If you have any questions or concerns, please don't hesitate to call.         Sincerely,         Leonie Bhakta MD        CC: No Recipients

## 2023-07-13 NOTE — PATIENT INSTRUCTIONS
Changed Venlafaxine to extended realeas 150 mg daily. Try taking in morning.     Added Mirtazapine 15 mg daily. For anxiety and depression. Helps the Venlafaxine work better. Most people take at bedtime but do what works for you.     Continue Iron and Tyroid medication.    Follow up in 3 months. Get your labs nonfasting next time before appointment.

## 2023-07-13 NOTE — ASSESSMENT & PLAN NOTE
HGB still less than 10. Continue iron 2 times a day. Repeat in 3 months. Bleeding is improved so should stabilize.

## 2023-07-13 NOTE — PROGRESS NOTES
Subjective   Patient ID: Marleni Syed is a 38 y.o. female who presents for Follow-up.    Here for follow up.     Seen 6/2/23.    Hypothyroid - pm :evpthyroxine 50 mcg for 3 months. Lab done. She is still feeling run down.     Elevated A1C - had repeat labs done in the meantime. Repeat was done.     Anemia due to blood loss after cervical cancer surgery - lab were done. She is still exhausted.     Anxiety - on Venlafaxine 75 mg bid. Was to reassess once Euthyroid. Has taken Zoloft which did not work. Sleep varies. Appetite is down.            Current Outpatient Medications:     buprenorphine (Subtex) 8 mg, Take 1 tab SL 6 days per week then 3/4 1 day per week, Disp: , Rfl:     levothyroxine (Synthroid, Levoxyl) 50 mcg tablet, Take 1 tablet (50 mcg) by mouth once daily in the morning. Take before meals., Disp: 90 tablet, Rfl: 3    mirtazapine (Remeron) 15 mg tablet, Take 1 tablet (15 mg) by mouth once daily at bedtime., Disp: 30 tablet, Rfl: 2    venlafaxine XR (Effexor-XR) 150 mg 24 hr capsule, Take 1 capsule (150 mg) by mouth once daily. Take with food., Disp: 30 capsule, Rfl: 1    Patient Active Problem List   Diagnosis    Generalized anxiety disorder    Depression, major, single episode, mild (CMS/HCC)    Insomnia due to other mental disorder    Nicotine dependence    Opioid dependence on maintenance agonist therapy, no symptoms (CMS/HCC)    Pain, joint, multiple sites    Squamous cell carcinoma of cervix, stage 1 (CMS/HCC)    Weight gain    Other fatigue    Elevated hemoglobin A1c    Acquired hypothyroidism    Anemia due to blood loss         Review of Systems   Constitutional:  Negative for unexpected weight change.   Respiratory:  Negative for cough and shortness of breath.    Cardiovascular:  Negative for chest pain, palpitations and leg swelling.   Skin:  Negative for rash.   Neurological:  Negative for headaches.   Psychiatric/Behavioral:  Negative for confusion.        Objective   /75   Pulse 79    "Temp 36.5 °C (97.7 °F)   Ht 1.651 m (5' 5\")   Wt 72.2 kg (159 lb 3.2 oz)   SpO2 100%   BMI 26.49 kg/m²     Physical Exam  Vitals reviewed.   Constitutional:       Appearance: Normal appearance.   Pulmonary:      Effort: Pulmonary effort is normal.   Neurological:      Mental Status: She is alert.   Psychiatric:         Mood and Affect: Mood normal.         Behavior: Behavior normal.         Recent Results (from the past 1344 hour(s))   Thyroid Peroxidase (TPO) Antibody    Collection Time: 06/01/23  1:10 PM   Result Value Ref Range    Antithyroid Peroxidase Ab <28 IU/mL   Thyroglobulin and Antithyroglobulin    Collection Time: 06/01/23  1:10 PM   Result Value Ref Range    Thyroglobulin 12.4 1.3 - 31.8 ng/mL    Thyroglobulin LC-MS/MS Not Applicable 1.3 - 31.8 ng/mL    Anti-Thyroglobulin AB <0.9 0.0 - 4.0 IU/mL   Thyroxine, Free    Collection Time: 06/01/23  1:10 PM   Result Value Ref Range    Free T4 0.79 0.61 - 1.12 ng/dL   Thyroid Stimulating Hormone    Collection Time: 06/01/23  1:10 PM   Result Value Ref Range    TSH 0.93 0.44 - 3.98 mIU/L   CBC and Auto Differential    Collection Time: 06/01/23  1:12 PM   Result Value Ref Range    WBC 3.9 (L) 4.4 - 11.3 x10E9/L    RBC 4.48 4.00 - 5.20 x10E12/L    Hemoglobin 9.5 (L) 12.0 - 16.0 g/dL    Hematocrit 33.3 (L) 36.0 - 46.0 %    MCV 74 (L) 80 - 100 fL    MCHC 28.5 (L) 32.0 - 36.0 g/dL    Platelets 440 150 - 450 x10E9/L    RDW 18.6 (H) 11.5 - 14.5 %    Neutrophils % 62.1 40.0 - 80.0 %    Immature Granulocytes %, Automated 0.3 0.0 - 0.9 %    Lymphocytes % 25.9 13.0 - 44.0 %    Monocytes % 7.6 2.0 - 10.0 %    Eosinophils % 2.8 0.0 - 6.0 %    Basophils % 1.3 0.0 - 2.0 %    Neutrophils Absolute 2.45 1.20 - 7.70 x10E9/L    Lymphocytes Absolute 1.02 (L) 1.20 - 4.80 x10E9/L    Monocytes Absolute 0.30 0.10 - 1.00 x10E9/L    Eosinophils Absolute 0.11 0.00 - 0.70 x10E9/L    Basophils Absolute 0.05 0.00 - 0.10 x10E9/L   Hemoglobin A1C    Collection Time: 06/01/23  1:12 PM "   Result Value Ref Range    Hemoglobin A1C 5.9 (A) %    Estimated Average Glucose 123 MG/DL   Basic Metabolic Panel    Collection Time: 06/01/23  1:12 PM   Result Value Ref Range    Glucose 52 (L) 74 - 99 mg/dL    Sodium 139 136 - 145 mmol/L    Potassium 4.3 3.5 - 5.3 mmol/L    Chloride 103 98 - 107 mmol/L    Bicarbonate 27 21 - 32 mmol/L    Anion Gap 13 10 - 20 mmol/L    Urea Nitrogen 18 6 - 23 mg/dL    Creatinine 0.69 0.50 - 1.05 mg/dL    GFR Female >90 >90 mL/min/1.73m2    Calcium 9.6 8.6 - 10.3 mg/dL   Lipid Panel    Collection Time: 06/01/23  1:12 PM   Result Value Ref Range    Cholesterol 163 0 - 199 mg/dL    HDL 44.9 mg/dL    Cholesterol/HDL Ratio 3.6     LDL 99 0 - 99 mg/dL    VLDL 19 0 - 40 mg/dL    Triglycerides 96 0 - 149 mg/dL   Iron and TIBC    Collection Time: 06/01/23  1:12 PM   Result Value Ref Range    Iron 39 35 - 150 ug/dL    TIBC 435 240 - 445 ug/dL    Iron Saturation 9 (L) 25 - 45 %   Ferritin    Collection Time: 06/01/23  1:12 PM   Result Value Ref Range    Ferritin 15 8 - 150 ug/L   CYTOLOGY GYN RESULTS    Collection Time: 06/29/23 12:00 AM   Result Value Ref Range    Pathology Report           Accession #: [id number]     Date of Procedure:  6/29/2023       Pathologist: [facility], Cytology  Date Reported: 7/6/2023  Date Received:  6/29/2023  Submitting Physician: ANKITA LUCAS MD  Attending Physician: ANKITA LUCAS MD                           FINAL CYTOLOGICAL INTERPRETATION        A.  THINPREP PAP CERVICAL:              Specimen Adequacy:       SATISFACTORY FOR EVALUATION.       Quality Indicator: Absence of endocervical/transformation zone component.              General Categorization:       NEGATIVE FOR INTRAEPITHELIAL LESION OR MALIGNANCY.                            HIGH RISK HPV TEST RESULT:                       HPV GENOTYPE  16                      NEGATIVE       HPV GENOTYPE  18                      NEGATIVE       HPV GENOTYPE  OTHER              NEGATIVE              Reference Range: Negative                  Testing for high-risk (HR) type of human papilloma virus (HPV) is performed by  the Roche enriqueta HPV Test.  The cob as HPV Test is a qualitative polymerase chain  reaction that amplifies DNA of HPV16, HPV18 and 12 other high-risk HPV types  (31, 33, 35, 39, 45, 51, 52, 56, 58, 59, 66, and 68) associated with cervical  cancer and its precursor lesions.  A positive result indicates the presence of  HPV DNA due to one or more of the 14 genotypes: 16, 18, 31, 33, 35, 39, 45, 51,  52, 56, 58, 59, 66, and 68. Negative results indicate HPV DNA concentrations  are undetectable or below the pre-set threshold for detection. False negative  results may be associated with unoptimized sampling. A negative HR HPV result  does not exclude the possibility of future cytologic HSIL or underlying CIN2-3  or cancer.    This test is approved for cervical specimens by  the [place] Food and Drug  Administration. Results of this test should be interpreted in conjunction with  the patient’s Pap test results.  Please refer to ASCCP current guidelines for  the use of HPV DNA testing, result interpretation, and patient management.    The performance of this test was verified by the Molecular Diagnostic  Laboratory at [facility]. The lab is  certified under the Clinical Laboratory Amendments of 1988 (CLIA 88) as  qualified to perform high complexity clinical laboratory testing.    This specimen has been analyzed by the GodigexPrep Imaging System ([employer]  an automated imaging and review system, which assists the laboratory in  evaluating cells on ThinPrep Pap tests. Following automated imaging, selected  fields from every slide were reviewed by a cytotechnologist and/or pathologist.    Electronically Signed Out By [facility],  Cytology//CRR/SLD   By the signature on this report, the individual or group listed as making  the  Final Interpretation/Diagnosis certifies that they have reviewed this case.  Diagnostic interpretation performed at [facility] [id number] [place] [address]. [place] [id number]  Educational Note:  Cervical cytology is a screening p rocedure primarily for squamous cancers and  precursors and has associated false-negative and false-positive results as  evidenced by published data.  Your patient’s test should be interpreted in this  context, together with patient’s history and clinical findings.  Regular  sampling and follow-up of unexplained clinical signs and symptoms are  recommended to minimize false negative results.         Clinical History  Date of Last Menstrual Period:     6/22/23    Cancer History:  Cervical Carcinoma: HISTORY OF STAGE 1B2 SCC OF THE CERVIX S/P RADICAL  TRACHELECTOMY  Previous Abnormal Cytology:  HPV +  Treatment History:  Cervical Ablative/Excisional Procedure  Other Clinical Conditions:  COTEST HPV(Genotype) except for ASC-H, HSIL, Carcinoma - Include HPV Genotype  testing       Source of Specimen  A: THINPREP PAP CERVICAL            [facility]  Department of Pathology   [id number] [place], [place] [id number]             Assessment/Plan   Problem List Items Addressed This Visit       Depression, major, single episode, mild (CMS/HCC)     Continue Venlafaxine 150, switched to extended release once a day. Added Remeron 15 at hs.          Relevant Medications    mirtazapine (Remeron) 15 mg tablet    venlafaxine XR (Effexor-XR) 150 mg 24 hr capsule    Insomnia due to other mental disorder - Primary     Remeron started for moods. Will see how that affects sleep.          Other fatigue     Suspect still due to anemia. Thyroid is iin range currently.          Elevated hemoglobin A1c     Aic 5.9 but HGB still low and not sure what actual A1C would be. Glu was 52 at time lab drawn. Repeat in 3-6 months once Hgb over 10 again.          Acquired hypothyroidism      TSH and T4 in range on 50 mcg daily. Continue.          Relevant Medications    levothyroxine (Synthroid, Levoxyl) 50 mcg tablet    Anemia due to blood loss     HGB still less than 10. Continue iron 2 times a day. Repeat in 3 months. Bleeding is improved so should stabilize.               Assessment, plans, tests, and follow up discussed with patient and patient verbalized understanding. Marleni was given an opportunity to ask questions and  any concerns were addressed including but not limited to med adjustments, follow up and labs. .

## 2023-07-13 NOTE — ASSESSMENT & PLAN NOTE
Aic 5.9 but HGB still low and not sure what actual A1C would be. Glu was 52 at time lab drawn. Repeat in 3-6 months once Hgb over 10 again.

## 2023-07-13 NOTE — ASSESSMENT & PLAN NOTE
Change to Venlafaxine Er once a day. May give her more even results. Add Mirtazapine at hs. Has buspirone at home but only rarely uses. Follow up in 3 months, prn. Call if need to go up to 30 mg on Remeron.

## 2023-08-04 ENCOUNTER — TELEMEDICINE (OUTPATIENT)
Dept: PRIMARY CARE | Facility: CLINIC | Age: 38
End: 2023-08-04

## 2023-08-04 ENCOUNTER — TELEPHONE (OUTPATIENT)
Dept: PRIMARY CARE | Facility: CLINIC | Age: 38
End: 2023-08-04

## 2023-08-04 DIAGNOSIS — F41.1 GENERALIZED ANXIETY DISORDER: Primary | ICD-10-CM

## 2023-08-04 PROCEDURE — 99214 OFFICE O/P EST MOD 30 MIN: CPT | Performed by: FAMILY MEDICINE

## 2023-08-04 RX ORDER — BUSPIRONE HYDROCHLORIDE 15 MG/1
15 TABLET ORAL 3 TIMES DAILY PRN
Qty: 60 TABLET | Refills: 1 | Status: SHIPPED | OUTPATIENT
Start: 2023-08-04 | End: 2023-10-20 | Stop reason: ALTCHOICE

## 2023-08-04 ASSESSMENT — ENCOUNTER SYMPTOMS
DECREASED CONCENTRATION: 1
AGITATION: 0
DYSPHORIC MOOD: 0
NERVOUS/ANXIOUS: 1

## 2023-08-04 NOTE — PROGRESS NOTES
Subjective   Patient ID: Marleni Syed is a 38 y.o. female who presents for No chief complaint on file..    Marleni Syed presents for a scheduled Telemedicine visit for worsening anxiety. Called today wanting urgent apointment. Patient seen via synchronous audio and video platform. Patient is located at home and I am in my office. . Patient consents to being seen via telemedicine platform, understands the limitations of the platform, and understands that she can be seen in office if preferred. Patient declined in office visit.      She was doing fine but recently lot worse. 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 She is getting a lot of problems at work. Her job has been threatened. This really set her off. She has had more bleeding due to her gyn issues and does not have FMLA papers. Venlafaxine 150 mg daily for a while. Mirtazapine not helping her sleep she thinks, cannot get to bed early because her anxiety is so bad. She had been on Buspirone 5 mg and took 2 at a time. She is willing to look at increased dose.            Current Outpatient Medications:     buprenorphine (Subtex) 8 mg, Take 1 tab SL 6 days per week then 3/4 1 day per week, Disp: , Rfl:     busPIRone (Buspar) 15 mg tablet, Take 1 tablet (15 mg) by mouth 3 times a day as needed (anxiety)., Disp: 60 tablet, Rfl: 1    levothyroxine (Synthroid, Levoxyl) 50 mcg tablet, Take 1 tablet (50 mcg) by mouth once daily in the morning. Take before meals., Disp: 90 tablet, Rfl: 3    mirtazapine (Remeron) 15 mg tablet, Take 1 tablet (15 mg) by mouth once daily at bedtime., Disp: 30 tablet, Rfl: 2    venlafaxine XR (Effexor-XR) 150 mg 24 hr capsule, Take 1 capsule (150 mg) by mouth once daily. Take with food., Disp: 30 capsule, Rfl: 1    Patient Active Problem List   Diagnosis    Generalized anxiety disorder    Depression, major, single episode, mild (CMS/HCC)    Insomnia due to other mental disorder    Nicotine dependence    Opioid  dependence on maintenance agonist therapy, no symptoms (CMS/HCC)    Pain, joint, multiple sites    Squamous cell carcinoma of cervix, stage 1 (CMS/HCC)    Weight gain    Other fatigue    Elevated hemoglobin A1c    Acquired hypothyroidism    Anemia due to blood loss         Review of Systems   Psychiatric/Behavioral:  Positive for decreased concentration. Negative for agitation, behavioral problems, dysphoric mood and suicidal ideas. The patient is nervous/anxious.        Objective   There were no vitals taken for this visit.    Physical Exam  Constitutional:       Appearance: Normal appearance.   Pulmonary:      Effort: Pulmonary effort is normal.   Skin:     General: Skin is warm and dry.   Neurological:      Mental Status: She is alert.   Psychiatric:         Behavior: Behavior normal.         Thought Content: Thought content normal.         Judgment: Judgment normal.      Comments: anxious         Assessment/Plan   Problem List Items Addressed This Visit       Generalized anxiety disorder - Primary     Last seen 7/13 and was doing well, just not sleeping. On Venlafaxine 150 mg daily along with Mirtazapine 15 mg at hs.   She is having sudden worsening of symptoms.          Relevant Medications    busPIRone (Buspar) 15 mg tablet         Assessment, plans, tests, and follow up discussed with patient and patient verbalized understanding. Marleni was given an opportunity to ask questions and  any concerns were addressed including but not limited to medications, follow up, need to get FMLA papers. .

## 2023-08-04 NOTE — TELEPHONE ENCOUNTER
Patient called in and stated that she is having bad anxiety attacks and her Venlafaxine is not ghelping. The patient would like to know can her medication dosage be changed or can she set up a virtual today about this? The patient stated because if the anxiety it is hard for her to work and she is on the verge of losing on job.

## 2023-08-04 NOTE — ASSESSMENT & PLAN NOTE
Last seen 7/13 and was doing well, just not sleeping. On Venlafaxine 150 mg daily along with Mirtazapine 15 mg at hs.   She is having sudden worsening of symptoms.

## 2023-09-27 ENCOUNTER — TELEPHONE (OUTPATIENT)
Dept: PRIMARY CARE | Facility: CLINIC | Age: 38
End: 2023-09-27

## 2023-09-27 DIAGNOSIS — F32.0 DEPRESSION, MAJOR, SINGLE EPISODE, MILD (CMS-HCC): ICD-10-CM

## 2023-09-27 RX ORDER — VENLAFAXINE HYDROCHLORIDE 150 MG/1
150 CAPSULE, EXTENDED RELEASE ORAL DAILY
Qty: 90 CAPSULE | Refills: 0 | Status: SHIPPED | OUTPATIENT
Start: 2023-09-27 | End: 2023-12-27 | Stop reason: SDUPTHER

## 2023-09-27 NOTE — TELEPHONE ENCOUNTER
Rx Refill Request Telephone Encounter    Name:  Marleni Syed  :  [id number]  Medication Name:        venlafaxine XR (Effexor-XR) 150 mg 24 hr capsule           Specific Pharmacy location:    [facility]/pharmacy #[id number] [address] AT [facility]        Date of last appointment:  23  Date of next appointment: 10/16/23   Best number to reach patient: [phone]

## 2023-10-02 ENCOUNTER — HOSPITAL ENCOUNTER (INPATIENT)
Facility: HOSPITAL | Age: 38
End: 2023-10-02
Attending: STUDENT IN AN ORGANIZED HEALTH CARE EDUCATION/TRAINING PROGRAM | Admitting: STUDENT IN AN ORGANIZED HEALTH CARE EDUCATION/TRAINING PROGRAM

## 2023-10-02 ENCOUNTER — APPOINTMENT (OUTPATIENT)
Dept: RADIOLOGY | Facility: HOSPITAL | Age: 38
End: 2023-10-02

## 2023-10-02 ENCOUNTER — HOSPITAL ENCOUNTER (EMERGENCY)
Facility: HOSPITAL | Age: 38
Discharge: LEFT BEFORE DISCHARGE | End: 2023-10-03
Attending: STUDENT IN AN ORGANIZED HEALTH CARE EDUCATION/TRAINING PROGRAM | Admitting: EMERGENCY MEDICINE

## 2023-10-02 ENCOUNTER — DOCUMENTATION (OUTPATIENT)
Dept: OBSTETRICS AND GYNECOLOGY | Facility: CLINIC | Age: 38
End: 2023-10-02

## 2023-10-02 DIAGNOSIS — N71.9 ENDOMETRITIS: Primary | ICD-10-CM

## 2023-10-02 LAB
ALBUMIN SERPL BCP-MCNC: 4 G/DL (ref 3.4–5)
ALP SERPL-CCNC: 53 U/L (ref 33–110)
ALT SERPL W P-5'-P-CCNC: 8 U/L (ref 7–45)
ANION GAP SERPL CALC-SCNC: 10 MMOL/L (ref 10–20)
APPEARANCE UR: CLEAR
AST SERPL W P-5'-P-CCNC: 12 U/L (ref 9–39)
B-HCG SERPL-ACNC: <2 MIU/ML
BACTERIA #/AREA URNS AUTO: ABNORMAL /HPF
BASOPHILS # BLD AUTO: 0.04 X10*3/UL (ref 0–0.1)
BASOPHILS NFR BLD AUTO: 0.2 %
BILIRUB SERPL-MCNC: 0.5 MG/DL (ref 0–1.2)
BILIRUB UR STRIP.AUTO-MCNC: NEGATIVE MG/DL
BUN SERPL-MCNC: 10 MG/DL (ref 6–23)
CALCIUM SERPL-MCNC: 8.8 MG/DL (ref 8.6–10.3)
CHLORIDE SERPL-SCNC: 100 MMOL/L (ref 98–107)
CO2 SERPL-SCNC: 28 MMOL/L (ref 21–32)
COLOR UR: YELLOW
CREAT SERPL-MCNC: 0.62 MG/DL (ref 0.5–1.05)
EOSINOPHIL # BLD AUTO: 0 X10*3/UL (ref 0–0.7)
EOSINOPHIL NFR BLD AUTO: 0 %
ERYTHROCYTE [DISTWIDTH] IN BLOOD BY AUTOMATED COUNT: 15 % (ref 11.5–14.5)
FLUAV RNA RESP QL NAA+PROBE: NOT DETECTED
FLUBV RNA RESP QL NAA+PROBE: NOT DETECTED
GFR SERPL CREATININE-BSD FRML MDRD: >90 ML/MIN/1.73M*2
GLUCOSE SERPL-MCNC: 94 MG/DL (ref 74–99)
GLUCOSE UR STRIP.AUTO-MCNC: NEGATIVE MG/DL
HCG UR QL IA.RAPID: NEGATIVE
HCT VFR BLD AUTO: 34.7 % (ref 36–46)
HGB BLD-MCNC: 11.6 G/DL (ref 12–16)
IMM GRANULOCYTES # BLD AUTO: 0.09 X10*3/UL (ref 0–0.7)
IMM GRANULOCYTES NFR BLD AUTO: 0.5 % (ref 0–0.9)
KETONES UR STRIP.AUTO-MCNC: NEGATIVE MG/DL
LACTATE SERPL-SCNC: 0.4 MMOL/L (ref 0.4–2)
LEUKOCYTE ESTERASE UR QL STRIP.AUTO: NEGATIVE
LIPASE SERPL-CCNC: 10 U/L (ref 9–82)
LYMPHOCYTES # BLD AUTO: 0.66 X10*3/UL (ref 1.2–4.8)
LYMPHOCYTES NFR BLD AUTO: 4 %
MCH RBC QN AUTO: 28.2 PG (ref 26–34)
MCHC RBC AUTO-ENTMCNC: 33.4 G/DL (ref 32–36)
MCV RBC AUTO: 84 FL (ref 80–100)
MONOCYTES # BLD AUTO: 0.88 X10*3/UL (ref 0.1–1)
MONOCYTES NFR BLD AUTO: 5.3 %
NEUTROPHILS # BLD AUTO: 15.03 X10*3/UL (ref 1.2–7.7)
NEUTROPHILS NFR BLD AUTO: 90 %
NITRITE UR QL STRIP.AUTO: NEGATIVE
NRBC BLD-RTO: 0 /100 WBCS (ref 0–0)
PH UR STRIP.AUTO: 7 [PH]
PLATELET # BLD AUTO: 328 X10*3/UL (ref 150–450)
PMV BLD AUTO: 9.7 FL (ref 7.5–11.5)
POTASSIUM SERPL-SCNC: 3.8 MMOL/L (ref 3.5–5.3)
PROT SERPL-MCNC: 7.5 G/DL (ref 6.4–8.2)
PROT UR STRIP.AUTO-MCNC: NEGATIVE MG/DL
RBC # BLD AUTO: 4.12 X10*6/UL (ref 4–5.2)
RBC # UR STRIP.AUTO: ABNORMAL /UL
RBC #/AREA URNS AUTO: ABNORMAL /HPF
SARS-COV-2 RNA RESP QL NAA+PROBE: NOT DETECTED
SODIUM SERPL-SCNC: 134 MMOL/L (ref 136–145)
SP GR UR STRIP.AUTO: 1.01
SQUAMOUS #/AREA URNS AUTO: ABNORMAL /HPF
UROBILINOGEN UR STRIP.AUTO-MCNC: <2 MG/DL
WBC # BLD AUTO: 16.7 X10*3/UL (ref 4.4–11.3)
WBC #/AREA URNS AUTO: ABNORMAL /HPF

## 2023-10-02 PROCEDURE — 81025 URINE PREGNANCY TEST: CPT | Performed by: STUDENT IN AN ORGANIZED HEALTH CARE EDUCATION/TRAINING PROGRAM

## 2023-10-02 PROCEDURE — 96376 TX/PRO/DX INJ SAME DRUG ADON: CPT

## 2023-10-02 PROCEDURE — 81001 URINALYSIS AUTO W/SCOPE: CPT | Performed by: STUDENT IN AN ORGANIZED HEALTH CARE EDUCATION/TRAINING PROGRAM

## 2023-10-02 PROCEDURE — 80053 COMPREHEN METABOLIC PANEL: CPT | Performed by: STUDENT IN AN ORGANIZED HEALTH CARE EDUCATION/TRAINING PROGRAM

## 2023-10-02 PROCEDURE — 99214 OFFICE O/P EST MOD 30 MIN: CPT | Performed by: STUDENT IN AN ORGANIZED HEALTH CARE EDUCATION/TRAINING PROGRAM

## 2023-10-02 PROCEDURE — 87636 SARSCOV2 & INF A&B AMP PRB: CPT | Performed by: STUDENT IN AN ORGANIZED HEALTH CARE EDUCATION/TRAINING PROGRAM

## 2023-10-02 PROCEDURE — 2500000004 HC RX 250 GENERAL PHARMACY W/ HCPCS (ALT 636 FOR OP/ED): Performed by: EMERGENCY MEDICINE

## 2023-10-02 PROCEDURE — 84702 CHORIONIC GONADOTROPIN TEST: CPT | Performed by: STUDENT IN AN ORGANIZED HEALTH CARE EDUCATION/TRAINING PROGRAM

## 2023-10-02 PROCEDURE — 36415 COLL VENOUS BLD VENIPUNCTURE: CPT | Performed by: STUDENT IN AN ORGANIZED HEALTH CARE EDUCATION/TRAINING PROGRAM

## 2023-10-02 PROCEDURE — 99285 EMERGENCY DEPT VISIT HI MDM: CPT | Performed by: STUDENT IN AN ORGANIZED HEALTH CARE EDUCATION/TRAINING PROGRAM

## 2023-10-02 PROCEDURE — 96367 TX/PROPH/DG ADDL SEQ IV INF: CPT

## 2023-10-02 PROCEDURE — 83690 ASSAY OF LIPASE: CPT | Performed by: STUDENT IN AN ORGANIZED HEALTH CARE EDUCATION/TRAINING PROGRAM

## 2023-10-02 PROCEDURE — 87075 CULTR BACTERIA EXCEPT BLOOD: CPT | Mod: CMCLAB,PORLAB | Performed by: STUDENT IN AN ORGANIZED HEALTH CARE EDUCATION/TRAINING PROGRAM

## 2023-10-02 PROCEDURE — 76856 US EXAM PELVIC COMPLETE: CPT | Performed by: RADIOLOGY

## 2023-10-02 PROCEDURE — 96365 THER/PROPH/DIAG IV INF INIT: CPT | Mod: XU

## 2023-10-02 PROCEDURE — 2500000004 HC RX 250 GENERAL PHARMACY W/ HCPCS (ALT 636 FOR OP/ED)

## 2023-10-02 PROCEDURE — 96361 HYDRATE IV INFUSION ADD-ON: CPT

## 2023-10-02 PROCEDURE — 2500000004 HC RX 250 GENERAL PHARMACY W/ HCPCS (ALT 636 FOR OP/ED): Performed by: STUDENT IN AN ORGANIZED HEALTH CARE EDUCATION/TRAINING PROGRAM

## 2023-10-02 PROCEDURE — G1004 CDSM NDSC: HCPCS

## 2023-10-02 PROCEDURE — 96375 TX/PRO/DX INJ NEW DRUG ADDON: CPT

## 2023-10-02 PROCEDURE — 85025 COMPLETE CBC W/AUTO DIFF WBC: CPT | Performed by: STUDENT IN AN ORGANIZED HEALTH CARE EDUCATION/TRAINING PROGRAM

## 2023-10-02 PROCEDURE — 76856 US EXAM PELVIC COMPLETE: CPT

## 2023-10-02 PROCEDURE — 74177 CT ABD & PELVIS W/CONTRAST: CPT | Performed by: RADIOLOGY

## 2023-10-02 PROCEDURE — 96366 THER/PROPH/DIAG IV INF ADDON: CPT

## 2023-10-02 PROCEDURE — 2500000001 HC RX 250 WO HCPCS SELF ADMINISTERED DRUGS (ALT 637 FOR MEDICARE OP): Performed by: STUDENT IN AN ORGANIZED HEALTH CARE EDUCATION/TRAINING PROGRAM

## 2023-10-02 PROCEDURE — 87040 BLOOD CULTURE FOR BACTERIA: CPT | Mod: CMCLAB,PORLAB | Performed by: STUDENT IN AN ORGANIZED HEALTH CARE EDUCATION/TRAINING PROGRAM

## 2023-10-02 PROCEDURE — 83605 ASSAY OF LACTIC ACID: CPT | Performed by: STUDENT IN AN ORGANIZED HEALTH CARE EDUCATION/TRAINING PROGRAM

## 2023-10-02 PROCEDURE — 2550000001 HC RX 255 CONTRASTS: Performed by: STUDENT IN AN ORGANIZED HEALTH CARE EDUCATION/TRAINING PROGRAM

## 2023-10-02 RX ORDER — MORPHINE SULFATE 4 MG/ML
4 INJECTION, SOLUTION INTRAMUSCULAR; INTRAVENOUS
Status: DISCONTINUED | OUTPATIENT
Start: 2023-10-02 | End: 2023-10-03 | Stop reason: HOSPADM

## 2023-10-02 RX ORDER — MORPHINE SULFATE 4 MG/ML
4 INJECTION, SOLUTION INTRAMUSCULAR; INTRAVENOUS ONCE
Status: DISCONTINUED | OUTPATIENT
Start: 2023-10-02 | End: 2023-10-02

## 2023-10-02 RX ORDER — ACETAMINOPHEN 325 MG/1
650 TABLET ORAL ONCE
Status: COMPLETED | OUTPATIENT
Start: 2023-10-02 | End: 2023-10-02

## 2023-10-02 RX ORDER — MORPHINE SULFATE 2 MG/ML
2 INJECTION, SOLUTION INTRAMUSCULAR; INTRAVENOUS ONCE
Status: COMPLETED | OUTPATIENT
Start: 2023-10-02 | End: 2023-10-02

## 2023-10-02 RX ORDER — MORPHINE SULFATE 2 MG/ML
2 INJECTION, SOLUTION INTRAMUSCULAR; INTRAVENOUS
Status: DISCONTINUED | OUTPATIENT
Start: 2023-10-02 | End: 2023-10-03 | Stop reason: HOSPADM

## 2023-10-02 RX ORDER — MORPHINE SULFATE 4 MG/ML
INJECTION INTRAVENOUS
Status: COMPLETED
Start: 2023-10-02 | End: 2023-10-02

## 2023-10-02 RX ORDER — DOXYCYCLINE 100 MG/10ML
100 INJECTION, POWDER, LYOPHILIZED, FOR SOLUTION INTRAVENOUS EVERY 12 HOURS
Status: DISCONTINUED | OUTPATIENT
Start: 2023-10-02 | End: 2023-10-02

## 2023-10-02 RX ADMIN — PIPERACILLIN SODIUM AND TAZOBACTAM SODIUM 3.38 G: 3; .375 INJECTION, SOLUTION INTRAVENOUS at 22:53

## 2023-10-02 RX ADMIN — SODIUM CHLORIDE 1000 ML: 9 INJECTION, SOLUTION INTRAVENOUS at 09:20

## 2023-10-02 RX ADMIN — Medication 100 MG: at 19:15

## 2023-10-02 RX ADMIN — MORPHINE SULFATE 4 MG: 4 INJECTION, SOLUTION INTRAMUSCULAR; INTRAVENOUS at 19:04

## 2023-10-02 RX ADMIN — SODIUM CHLORIDE 1000 ML: 9 INJECTION, SOLUTION INTRAVENOUS at 14:02

## 2023-10-02 RX ADMIN — PIPERACILLIN SODIUM AND TAZOBACTAM SODIUM 3.38 G: 3; .375 INJECTION, SOLUTION INTRAVENOUS at 14:02

## 2023-10-02 RX ADMIN — ACETAMINOPHEN 650 MG: 325 TABLET ORAL at 09:19

## 2023-10-02 RX ADMIN — IOHEXOL 100 ML: 350 INJECTION, SOLUTION INTRAVENOUS at 11:46

## 2023-10-02 RX ADMIN — MORPHINE SULFATE 4 MG: 4 INJECTION INTRAVENOUS at 16:27

## 2023-10-02 RX ADMIN — MORPHINE SULFATE 2 MG: 2 INJECTION, SOLUTION INTRAMUSCULAR; INTRAVENOUS at 14:02

## 2023-10-02 ASSESSMENT — PAIN SCALES - GENERAL
PAINLEVEL_OUTOF10: 9
PAINLEVEL_OUTOF10: 7
PAINLEVEL_OUTOF10: 9
PAINLEVEL_OUTOF10: 0 - NO PAIN
PAINLEVEL_OUTOF10: 10 - WORST POSSIBLE PAIN
PAINLEVEL_OUTOF10: 9
PAINLEVEL_OUTOF10: 9

## 2023-10-02 ASSESSMENT — LIFESTYLE VARIABLES
EVER FELT BAD OR GUILTY ABOUT YOUR DRINKING: NO
EVER HAD A DRINK FIRST THING IN THE MORNING TO STEADY YOUR NERVES TO GET RID OF A HANGOVER: NO
HAVE YOU EVER FELT YOU SHOULD CUT DOWN ON YOUR DRINKING: NO
HAVE PEOPLE ANNOYED YOU BY CRITICIZING YOUR DRINKING: NO

## 2023-10-02 ASSESSMENT — COLUMBIA-SUICIDE SEVERITY RATING SCALE - C-SSRS
6. HAVE YOU EVER DONE ANYTHING, STARTED TO DO ANYTHING, OR PREPARED TO DO ANYTHING TO END YOUR LIFE?: NO
2. HAVE YOU ACTUALLY HAD ANY THOUGHTS OF KILLING YOURSELF?: NO
1. IN THE PAST MONTH, HAVE YOU WISHED YOU WERE DEAD OR WISHED YOU COULD GO TO SLEEP AND NOT WAKE UP?: NO

## 2023-10-02 ASSESSMENT — PAIN - FUNCTIONAL ASSESSMENT: PAIN_FUNCTIONAL_ASSESSMENT: 0-10

## 2023-10-02 ASSESSMENT — PAIN DESCRIPTION - LOCATION: LOCATION: ABDOMEN

## 2023-10-02 NOTE — ED PROVIDER NOTES
HPI   Chief Complaint   Patient presents with    Abdominal Pain     Pain x 1 week at the umbilicus. Had trachelectomy done less than a year ago     Fever    Nausea       This is a 38-year-old female past medical history of cervical cancer status postsurgery continues to have uterus and ovaries, patient had a trachelectomy last year she presents emerged department for abdominal pain has been worsening over the past week.  She does have sensation that she needs to urinate but states does not feel urinary tract infection.  She last had a period multiple months ago.  She denies any vaginal discharge.  She reports some nausea but no vomiting.  She has been having bowel movements.  She reports a fever.  Her pain is mostly in her suprapubic region that seems to spread to her entire abdomen.                          No data recorded                Patient History   Past Medical History:   Diagnosis Date    Anemia due to blood loss 2023    Hgb 7.2 after surgery for cervical cancer. Up to 9.2023. Resumed iron. Repeat due  or 2023.     Encounter for elective termination of pregnancy     , elective or therapeutic    Encounter for insertion of intrauterine contraceptive device     Encounter for insertion of mirena IUD    Generalized anxiety disorder 10/01/2012    Note: aa  Anxiety & Depression: Renata Su  Oct 15, 2021 4:16PM  Improving. Will increase effexor XR from 75 mg to 150 mg. Continue buspar TID prn.     Renata Su  2021 4:56PM  Severe anxiety and depression. Will start on effexor and prn buspar. Patient already has contact information for counseling and plans on scheduling an appointment in the near future. St    Hypoglycemia 2023    Insomnia due to other mental disorder 2023    Renata Su  2021 4:56PM  Likely due to anxiety and depression. May consider starting trazodone if continues to be an issue.    Opioid abuse,  in remission (CMS/[facility])     History of opioid abuse    Pain, joint, multiple sites 2023    Renata Su  Oct 15, 2021 4:16PM  Possibly fibromyalgia. DIscussed with patient that we would need to see the pateint in office to diagnose this. Briefly discussed treatment modalities such as exercise and explained that effexor may also treat this.    Squamous cell carcinoma of cervix, stage 1 (CMS/[facility]) 2023    Laparotomy 2022, abdominal radical trqchelectomy, abdomnal cerclage, sentinel lymph nose disseaction and uterosacral ligament dissection    Unspecified convulsions (CMS/[facility]) 2013    Convulsions     Past Surgical History:   Procedure Laterality Date     SECTION, CLASSIC  09/10/2018     Section    COLPOSCOPY  04/15/2014    Colposcopy    DILATION AND CURETTAGE OF UTERUS  04/15/2014    Dilation And Curettage    OTHER SURGICAL HISTORY  04/15/2014    Breast Surgery Enlargement Procedure    OTHER SURGICAL HISTORY      laprarotomy, radical traachelectomy, sentinel LN dissection     Family History   Problem Relation Name Age of Onset    Hyperlipidemia Father      Hypertension Father      Hypertension Other Grandmother      Social History     Tobacco Use    Smoking status: Never    Smokeless tobacco: Not on file   Vaping Use    Vaping Use: Every day    Substances: Nicotine    Devices: Disposable   Substance Use Topics    Alcohol use: Never    Drug use: Never       Physical Exam   ED Triage Vitals [10/02/23 0807]   Temp Heart Rate Resp BP   38.2 °C (100.7 °F) 106 18 108/68      SpO2 Temp Source Heart Rate Source Patient Position   99 % Temporal Monitor --      BP Location FiO2 (%)     -- --       Physical Exam  Constitutional:       General: She is not in acute distress.  HENT:      Head: Normocephalic and atraumatic.      Right Ear: Tympanic membrane normal.      Left Ear: Tympanic membrane normal.      Mouth/Throat:      Mouth: Mucous membranes are moist.   Eyes:       Extraocular Movements: Extraocular movements intact.      Conjunctiva/sclera: Conjunctivae normal.      Pupils: Pupils are equal, round, and reactive to light.   Cardiovascular:      Rate and Rhythm: Normal rate and regular rhythm.      Heart sounds: No murmur heard.  Pulmonary:      Effort: Pulmonary effort is normal. No respiratory distress.      Breath sounds: Normal breath sounds. No stridor. No wheezing or rales.   Abdominal:      General: Bowel sounds are normal. There is distension.      Tenderness: There is generalized abdominal tenderness. There is no guarding or rebound.   Musculoskeletal:         General: No swelling, tenderness or deformity. Normal range of motion.   Skin:     General: Skin is warm and dry.      Coloration: Skin is not jaundiced.      Findings: No bruising or lesion.   Neurological:      General: No focal deficit present.      Mental Status: She is alert and oriented to person, place, and time. Mental status is at baseline.      Cranial Nerves: No cranial nerve deficit.      Motor: No weakness.   Psychiatric:         Mood and Affect: Mood normal.       Labs Reviewed   HCG, URINE, QUALITATIVE   URINALYSIS WITH REFLEX MICROSCOPIC   CBC WITH AUTO DIFFERENTIAL   COMPREHENSIVE METABOLIC PANEL   LIPASE   LACTATE   SARS-COV-2 PCR, SYMPTOMATIC   INFLUENZA A AND B PCR   POCT PREGNANCY, URINE     CT abdomen pelvis w IV contrast    (Results Pending)       ED Course & MDM        Medical Decision Making  38-year-old female presents emerged department for worsening abdominal pain.  She does have tenderness to palpation on my exam.  Patient blood pressure did drop slightly while she was here she was given 2 L of fluid.  She does have a leukocytosis and a left shift and ultimately decided to get Zosyn.  Urine does not appear infected.  She is not pregnant.  CT of her abdomen was concerning for intraperitoneal fluid along with some density in her uterus and potential inflammatory changes.  I spoke with  Dr. Alvarezs gynecology who recommended obtaining an ultrasound of her pelvis.  She denies any vaginal discharge.  Have less patient for PID or TOA.    Dr. Alvarez is at bedside and will do an examination he believes that her cerclage is keeping her from having her menses and that this was infected.  He states that he will reach out to gynecology oncology to discuss the case. Treating pt for endmetritis         Procedure  Procedures

## 2023-10-02 NOTE — PROGRESS NOTES
Subjective   Patient ID: Marleni Syed is a 38 y.o. female who presents for pelvic pain. She has had it worsening over the past week. She presents to the ED due to worsening pain that is 10/10 with movement. She also had a fever of 102 yesterday. No nausea or vomiting.     Pt history is complicated by Cervical cancer for which she had a radical trachelectomy last year. She has been doing well, but had irregular periods. She had heavy bleeding in July, but it then abruptly stopped and she has not had bleeding since.     She was initially evaluated in the ED. She has been given morphine for pain and started on Zosyn.     Review of Systems  See above for pertinent positives. A complete review of systems was performed and otherwise found to be negative.    Objective   Physical Exam  General: A&Ox3  Head: Normocephalic, atraumatic  Heart/Lungs: Even chest rise, no increased work of breathing.  Abdomen: Soft, exquisite tenderness in the suprapubic region. No bruising or masses.  Genitourinary: Labia and vagina normal in appearance. Severe fundal tenderness limiting exam. Scant amount of physiologic discharge noted in the vagina. No blood or purulent drainage noted.  Lower Extremities: No lower extremity Edema no palpable cords.     Assessment/Plan   Endometritis  -LA wnl. CBC shows WBC of 16.7. B HCG neg. Blood cultures pending.  -Pt has severe fundal tenderness consistent with endometritis. Given patient's surgical history, consultation with Gyn/Onc is advised.   -Doxycycline added to Zosyn.   -TVUS reviewed. Collection within the uterus noted.   -I recommend transport to a facility where her primary surgeon would be available for consultation. Pt is amenable to this plan.

## 2023-10-03 VITALS
HEART RATE: 89 BPM | TEMPERATURE: 98.9 F | WEIGHT: 144 LBS | SYSTOLIC BLOOD PRESSURE: 91 MMHG | DIASTOLIC BLOOD PRESSURE: 67 MMHG | HEIGHT: 65 IN | BODY MASS INDEX: 23.99 KG/M2 | OXYGEN SATURATION: 100 % | RESPIRATION RATE: 16 BRPM

## 2023-10-03 PROCEDURE — 2500000004 HC RX 250 GENERAL PHARMACY W/ HCPCS (ALT 636 FOR OP/ED): Performed by: EMERGENCY MEDICINE

## 2023-10-03 PROCEDURE — 2500000004 HC RX 250 GENERAL PHARMACY W/ HCPCS (ALT 636 FOR OP/ED): Performed by: STUDENT IN AN ORGANIZED HEALTH CARE EDUCATION/TRAINING PROGRAM

## 2023-10-03 PROCEDURE — 96366 THER/PROPH/DIAG IV INF ADDON: CPT

## 2023-10-03 PROCEDURE — 96376 TX/PRO/DX INJ SAME DRUG ADON: CPT

## 2023-10-03 RX ADMIN — Medication 100 MG: at 07:05

## 2023-10-03 RX ADMIN — MORPHINE SULFATE 4 MG: 4 INJECTION, SOLUTION INTRAMUSCULAR; INTRAVENOUS at 03:32

## 2023-10-03 RX ADMIN — MORPHINE SULFATE 4 MG: 4 INJECTION, SOLUTION INTRAMUSCULAR; INTRAVENOUS at 00:00

## 2023-10-03 RX ADMIN — PIPERACILLIN SODIUM AND TAZOBACTAM SODIUM 3.38 G: 3; .375 INJECTION, SOLUTION INTRAVENOUS at 06:48

## 2023-10-03 RX ADMIN — MORPHINE SULFATE 4 MG: 4 INJECTION, SOLUTION INTRAMUSCULAR; INTRAVENOUS at 12:45

## 2023-10-03 RX ADMIN — MORPHINE SULFATE 4 MG: 4 INJECTION, SOLUTION INTRAMUSCULAR; INTRAVENOUS at 08:10

## 2023-10-03 RX ADMIN — PIPERACILLIN SODIUM AND TAZOBACTAM SODIUM 3.38 G: 3; .375 INJECTION, SOLUTION INTRAVENOUS at 14:09

## 2023-10-03 ASSESSMENT — PAIN SCALES - GENERAL
PAINLEVEL_OUTOF10: 0 - NO PAIN
PAINLEVEL_OUTOF10: 10 - WORST POSSIBLE PAIN

## 2023-10-03 NOTE — ED PROVIDER NOTES
Received patient in signout from Dr. Toledo. In short the patient is a 38 y.o. female presenting with Abdominal pain, fevers status post surgical intervention for cervical cancer status post cerclage, patient febrile and minimally hypotensive at time of presentation, found to have endometritis.  Was started on broad-spectrum antibiotics and received IV fluids, since that time patient has remained stable in the emergency department, initial plan had been for transfer to [facility] as this is where she had her obstetric surgery performed, patient has been boarding in the emergency department for approximately 32 hours at this time.  I was called into the patient's room as the patient is requesting to leave AGAINST MEDICAL ADVICE.  She conveys that she wishes to leave and then drive herself [place] [facility]  I did discuss with her that I could not guarantee that she would be seen faster at [facility], I did contact the transfer center confirmed that she was next in line for a bed at [facility] and I did discuss this with the patient and I conveyed that leaving now would lose her place in line for a bed.  Patient verbally conveys understanding to this and continues to convey her wish to leave AGAINST MEDICAL ADVICE.  At this time the patient is awake, alert, oriented, hemodynamically stable, very well-appearing, does not appear intoxicated and appears to have clinical decision-making capacity.  I did further discuss the risks of leaving as medical advice namely decompensation from suspected significant pelvic infection, she conveys understanding to this and still wants to leave AGAINST MEDICAL ADVICE.  Patient signed out AMA in good condition.       Tino Perez MD  10/03/23 1349

## 2023-10-05 ENCOUNTER — HOSPITAL ENCOUNTER (INPATIENT)
Facility: HOSPITAL | Age: 38
LOS: 1 days | Discharge: HOME | DRG: 745 | End: 2023-10-06
Attending: EMERGENCY MEDICINE | Admitting: EMERGENCY MEDICINE

## 2023-10-05 DIAGNOSIS — R10.30 LOWER ABDOMINAL PAIN: ICD-10-CM

## 2023-10-05 DIAGNOSIS — C53.9 MALIGNANT NEOPLASM OF CERVIX, UNSPECIFIED SITE (MULTI): ICD-10-CM

## 2023-10-05 LAB
ALBUMIN SERPL BCP-MCNC: 4.2 G/DL (ref 3.4–5)
ALP SERPL-CCNC: 60 U/L (ref 33–110)
ALT SERPL W P-5'-P-CCNC: 9 U/L (ref 7–45)
ANION GAP SERPL CALC-SCNC: 14 MMOL/L (ref 10–20)
APPEARANCE UR: CLEAR
APTT PPP: 34 SECONDS (ref 27–38)
AST SERPL W P-5'-P-CCNC: 17 U/L (ref 9–39)
BASOPHILS # BLD AUTO: 0.06 X10*3/UL (ref 0–0.1)
BASOPHILS NFR BLD AUTO: 0.6 %
BILIRUB SERPL-MCNC: 0.3 MG/DL (ref 0–1.2)
BILIRUB UR STRIP.AUTO-MCNC: NEGATIVE MG/DL
BUN SERPL-MCNC: 10 MG/DL (ref 6–23)
CALCIUM SERPL-MCNC: 9.5 MG/DL (ref 8.6–10.6)
CHLORIDE SERPL-SCNC: 102 MMOL/L (ref 98–107)
CO2 SERPL-SCNC: 25 MMOL/L (ref 21–32)
COLOR UR: NORMAL
CREAT SERPL-MCNC: 0.56 MG/DL (ref 0.5–1.05)
EOSINOPHIL # BLD AUTO: 0 X10*3/UL (ref 0–0.7)
EOSINOPHIL NFR BLD AUTO: 0 %
ERYTHROCYTE [DISTWIDTH] IN BLOOD BY AUTOMATED COUNT: 14.6 % (ref 11.5–14.5)
GFR SERPL CREATININE-BSD FRML MDRD: >90 ML/MIN/1.73M*2
GLUCOSE SERPL-MCNC: 75 MG/DL (ref 74–99)
GLUCOSE UR STRIP.AUTO-MCNC: NEGATIVE MG/DL
HCT VFR BLD AUTO: 32.9 % (ref 36–46)
HGB BLD-MCNC: 11 G/DL (ref 12–16)
IMM GRANULOCYTES # BLD AUTO: 0.06 X10*3/UL (ref 0–0.7)
IMM GRANULOCYTES NFR BLD AUTO: 0.6 % (ref 0–0.9)
INR PPP: 1.1 (ref 0.9–1.1)
KETONES UR STRIP.AUTO-MCNC: NEGATIVE MG/DL
LACTATE SERPL-SCNC: 0.5 MMOL/L (ref 0.4–2)
LEUKOCYTE ESTERASE UR QL STRIP.AUTO: NEGATIVE
LYMPHOCYTES # BLD AUTO: 1.08 X10*3/UL (ref 1.2–4.8)
LYMPHOCYTES NFR BLD AUTO: 10.5 %
MCH RBC QN AUTO: 27.4 PG (ref 26–34)
MCHC RBC AUTO-ENTMCNC: 33.4 G/DL (ref 32–36)
MCV RBC AUTO: 82 FL (ref 80–100)
MONOCYTES # BLD AUTO: 0.71 X10*3/UL (ref 0.1–1)
MONOCYTES NFR BLD AUTO: 6.9 %
NEUTROPHILS # BLD AUTO: 8.41 X10*3/UL (ref 1.2–7.7)
NEUTROPHILS NFR BLD AUTO: 81.4 %
NITRITE UR QL STRIP.AUTO: NEGATIVE
NRBC BLD-RTO: 0 /100 WBCS (ref 0–0)
PH UR STRIP.AUTO: 8 [PH]
PLATELET # BLD AUTO: 394 X10*3/UL (ref 150–450)
PMV BLD AUTO: 10.3 FL (ref 7.5–11.5)
POTASSIUM SERPL-SCNC: 3.9 MMOL/L (ref 3.5–5.3)
PROT SERPL-MCNC: 7.8 G/DL (ref 6.4–8.2)
PROT UR STRIP.AUTO-MCNC: NEGATIVE MG/DL
PROTHROMBIN TIME: 12.1 SECONDS (ref 9.8–12.8)
RBC # BLD AUTO: 4.02 X10*6/UL (ref 4–5.2)
RBC # UR STRIP.AUTO: NEGATIVE /UL
SODIUM SERPL-SCNC: 137 MMOL/L (ref 136–145)
SP GR UR STRIP.AUTO: 1.01
UROBILINOGEN UR STRIP.AUTO-MCNC: <2 MG/DL
WBC # BLD AUTO: 10.3 X10*3/UL (ref 4.4–11.3)

## 2023-10-05 PROCEDURE — 2500000004 HC RX 250 GENERAL PHARMACY W/ HCPCS (ALT 636 FOR OP/ED)

## 2023-10-05 PROCEDURE — 83605 ASSAY OF LACTIC ACID: CPT | Performed by: EMERGENCY MEDICINE

## 2023-10-05 PROCEDURE — 85730 THROMBOPLASTIN TIME PARTIAL: CPT | Performed by: EMERGENCY MEDICINE

## 2023-10-05 PROCEDURE — 2500000001 HC RX 250 WO HCPCS SELF ADMINISTERED DRUGS (ALT 637 FOR MEDICARE OP): Performed by: STUDENT IN AN ORGANIZED HEALTH CARE EDUCATION/TRAINING PROGRAM

## 2023-10-05 PROCEDURE — 2500000001 HC RX 250 WO HCPCS SELF ADMINISTERED DRUGS (ALT 637 FOR MEDICARE OP)

## 2023-10-05 PROCEDURE — 36415 COLL VENOUS BLD VENIPUNCTURE: CPT | Performed by: EMERGENCY MEDICINE

## 2023-10-05 PROCEDURE — 85025 COMPLETE CBC W/AUTO DIFF WBC: CPT | Performed by: EMERGENCY MEDICINE

## 2023-10-05 PROCEDURE — 99285 EMERGENCY DEPT VISIT HI MDM: CPT | Performed by: EMERGENCY MEDICINE

## 2023-10-05 PROCEDURE — 2500000004 HC RX 250 GENERAL PHARMACY W/ HCPCS (ALT 636 FOR OP/ED): Performed by: STUDENT IN AN ORGANIZED HEALTH CARE EDUCATION/TRAINING PROGRAM

## 2023-10-05 PROCEDURE — 80053 COMPREHEN METABOLIC PANEL: CPT | Performed by: EMERGENCY MEDICINE

## 2023-10-05 PROCEDURE — 81003 URINALYSIS AUTO W/O SCOPE: CPT | Performed by: EMERGENCY MEDICINE

## 2023-10-05 PROCEDURE — 1170000001 HC PRIVATE ONCOLOGY ROOM DAILY

## 2023-10-05 PROCEDURE — 85610 PROTHROMBIN TIME: CPT | Performed by: EMERGENCY MEDICINE

## 2023-10-05 PROCEDURE — 96374 THER/PROPH/DIAG INJ IV PUSH: CPT

## 2023-10-05 PROCEDURE — 93010 ELECTROCARDIOGRAM REPORT: CPT | Performed by: EMERGENCY MEDICINE

## 2023-10-05 RX ORDER — DOXYCYCLINE HYCLATE 100 MG
100 TABLET ORAL EVERY 12 HOURS SCHEDULED
Status: DISCONTINUED | OUTPATIENT
Start: 2023-10-05 | End: 2023-10-06 | Stop reason: HOSPADM

## 2023-10-05 RX ORDER — LEVOTHYROXINE SODIUM 50 UG/1
50 TABLET ORAL
Status: CANCELLED | OUTPATIENT
Start: 2023-10-06

## 2023-10-05 RX ORDER — METRONIDAZOLE 500 MG/1
500 TABLET ORAL EVERY 12 HOURS SCHEDULED
Status: DISCONTINUED | OUTPATIENT
Start: 2023-10-05 | End: 2023-10-06 | Stop reason: HOSPADM

## 2023-10-05 RX ORDER — ONDANSETRON HYDROCHLORIDE 2 MG/ML
4 INJECTION, SOLUTION INTRAVENOUS EVERY 6 HOURS PRN
Status: DISCONTINUED | OUTPATIENT
Start: 2023-10-05 | End: 2023-10-06 | Stop reason: HOSPADM

## 2023-10-05 RX ORDER — OXYCODONE HYDROCHLORIDE 5 MG/1
10 TABLET ORAL EVERY 4 HOURS PRN
Status: DISCONTINUED | OUTPATIENT
Start: 2023-10-05 | End: 2023-10-06 | Stop reason: HOSPADM

## 2023-10-05 RX ORDER — BUPRENORPHINE 2 MG/1
8 TABLET SUBLINGUAL DAILY
Status: CANCELLED | OUTPATIENT
Start: 2023-10-06

## 2023-10-05 RX ORDER — IBUPROFEN 600 MG/1
600 TABLET ORAL EVERY 6 HOURS PRN
Status: DISCONTINUED | OUTPATIENT
Start: 2023-10-05 | End: 2023-10-06 | Stop reason: HOSPADM

## 2023-10-05 RX ORDER — POLYETHYLENE GLYCOL 3350 17 G/17G
17 POWDER, FOR SOLUTION ORAL DAILY
Status: DISCONTINUED | OUTPATIENT
Start: 2023-10-05 | End: 2023-10-06 | Stop reason: HOSPADM

## 2023-10-05 RX ORDER — OXYCODONE HYDROCHLORIDE 5 MG/1
5 TABLET ORAL EVERY 4 HOURS PRN
Status: DISCONTINUED | OUTPATIENT
Start: 2023-10-05 | End: 2023-10-06 | Stop reason: HOSPADM

## 2023-10-05 RX ORDER — MORPHINE SULFATE 4 MG/ML
4 INJECTION INTRAVENOUS ONCE
Status: COMPLETED | OUTPATIENT
Start: 2023-10-05 | End: 2023-10-05

## 2023-10-05 RX ORDER — ACETAMINOPHEN 325 MG/1
975 TABLET ORAL ONCE
Status: COMPLETED | OUTPATIENT
Start: 2023-10-05 | End: 2023-10-05

## 2023-10-05 RX ORDER — ACETAMINOPHEN 325 MG/1
650 TABLET ORAL EVERY 4 HOURS PRN
Status: DISCONTINUED | OUTPATIENT
Start: 2023-10-05 | End: 2023-10-06 | Stop reason: HOSPADM

## 2023-10-05 RX ORDER — VENLAFAXINE HYDROCHLORIDE 150 MG/1
150 CAPSULE, EXTENDED RELEASE ORAL DAILY
Status: CANCELLED | OUTPATIENT
Start: 2023-10-05

## 2023-10-05 RX ADMIN — DOXYCYCLINE HYCLATE 100 MG: 100 TABLET, COATED ORAL at 21:59

## 2023-10-05 RX ADMIN — ACETAMINOPHEN 975 MG: 325 TABLET, FILM COATED ORAL at 06:35

## 2023-10-05 RX ADMIN — ACETAMINOPHEN 650 MG: 325 TABLET, FILM COATED ORAL at 16:26

## 2023-10-05 RX ADMIN — OXYCODONE HYDROCHLORIDE 10 MG: 5 TABLET ORAL at 16:26

## 2023-10-05 RX ADMIN — METRONIDAZOLE 500 MG: 500 TABLET ORAL at 21:59

## 2023-10-05 RX ADMIN — MORPHINE SULFATE 4 MG: 4 INJECTION INTRAVENOUS at 11:17

## 2023-10-05 RX ADMIN — POLYETHYLENE GLYCOL 3350 17 G: 17 POWDER, FOR SOLUTION ORAL at 16:26

## 2023-10-05 ASSESSMENT — COGNITIVE AND FUNCTIONAL STATUS - GENERAL
MOBILITY SCORE: 24
PATIENT BASELINE BEDBOUND: NO
DAILY ACTIVITIY SCORE: 24

## 2023-10-05 ASSESSMENT — PAIN DESCRIPTION - LOCATION: LOCATION: ABDOMEN

## 2023-10-05 ASSESSMENT — ENCOUNTER SYMPTOMS
COUGH: 0
NAUSEA: 0
WHEEZING: 0
CHILLS: 0
ABDOMINAL PAIN: 1
PALPITATIONS: 0
VOMITING: 0
SHORTNESS OF BREATH: 0
DIARRHEA: 0
FREQUENCY: 0
CONSTIPATION: 1
DYSURIA: 0
DIZZINESS: 0

## 2023-10-05 ASSESSMENT — LIFESTYLE VARIABLES
EVER FELT BAD OR GUILTY ABOUT YOUR DRINKING: NO
HAVE YOU EVER FELT YOU SHOULD CUT DOWN ON YOUR DRINKING: NO
EVER HAD A DRINK FIRST THING IN THE MORNING TO STEADY YOUR NERVES TO GET RID OF A HANGOVER: NO
HAVE PEOPLE ANNOYED YOU BY CRITICIZING YOUR DRINKING: NO

## 2023-10-05 ASSESSMENT — PAIN SCALES - GENERAL
PAINLEVEL_OUTOF10: 8
PAINLEVEL_OUTOF10: 9
PAINLEVEL_OUTOF10: 10 - WORST POSSIBLE PAIN
PAINLEVEL_OUTOF10: 9
PAINLEVEL_OUTOF10: 7
PAINLEVEL_OUTOF10: 0 - NO PAIN
PAINLEVEL_OUTOF10: 6

## 2023-10-05 ASSESSMENT — ACTIVITIES OF DAILY LIVING (ADL)
WALKS IN HOME: INDEPENDENT
ADEQUATE_TO_COMPLETE_ADL: YES
TOILETING: INDEPENDENT
GROOMING: INDEPENDENT
PATIENT'S MEMORY ADEQUATE TO SAFELY COMPLETE DAILY ACTIVITIES?: YES
JUDGMENT_ADEQUATE_SAFELY_COMPLETE_DAILY_ACTIVITIES: YES
DRESSING YOURSELF: INDEPENDENT
FEEDING YOURSELF: INDEPENDENT

## 2023-10-05 ASSESSMENT — PAIN DESCRIPTION - PAIN TYPE: TYPE: ACUTE PAIN

## 2023-10-05 ASSESSMENT — PAIN - FUNCTIONAL ASSESSMENT: PAIN_FUNCTIONAL_ASSESSMENT: 0-10

## 2023-10-05 ASSESSMENT — PAIN DESCRIPTION - DESCRIPTORS: DESCRIPTORS: SHARP;ACHING

## 2023-10-05 ASSESSMENT — PAIN DESCRIPTION - FREQUENCY: FREQUENCY: CONSTANT/CONTINUOUS

## 2023-10-05 NOTE — ED PROVIDER NOTES
CC: Abdominal Pain ([facility] Transfer)     History provided by: Patient  Limitations to History: History Limitations: None    HPI:  30-year-old female with history of cervical cancer s/p radical trachelectomy and cerclage presenting to the emergency department as a referral from [facility].  Went to [place] initially with abdominal pain on 10/2, had CT of the abdomen pelvis as well as pelvic ultrasound that demonstrates a scar cerclage and intrauterine fluid collection concerning for possible endometritis.  She was discharged AMA due to childcare difficulties, encouraged to come to [facility] for evaluation by GYN.  She reports moderate abdominal pain for the past 2 days as well as having fevers at home.  Denies any nausea or vomiting.  Denies any chest pain, shortness of breath, vaginal bleeding.    External Records Reviewed: Reviewed radiology results from 10/2 [facility]  ???????????????????????????????????????????????????????????????  Triage Vitals:  T 36.1 °C (97 °F)  HR 99  BP (!) 144/92  RR 16  O2 100 % None (Room air)    Vital signs reviewed in nursing triage note, EMR flow sheets, and at patient's bedside.   General: Awake, alert, in no acute distress  Eyes: Gaze conjugate.  No scleral icterus or injection  HENT: Normo-cephalic, atraumatic. No stridor. No rhinorrhea or epistaxis.  CV: Regular rhythm. No murmurs appreciated. Radial pulses 2+ bilaterally  Respiratory: Breathing non-labored, speaking in full sentences.  Clear to auscultation bilaterally  GI: Soft, non-distended, moderately tender in the suprapubic region. No rebound or guarding.  MSK/Extremities: No gross bony deformities. Moving all extremities  Skin: Warm. Appropriate color  Neuro: Alert. Oriented. Face symmetric. Speech is fluent.  Gross strength and sensation intact in b/l UE and LEs  Psych: Appropriate mood and affect  ???????????????????????????????????????????????????????????????  ED  Course/Treatment/Medical Decision Making    EKG Interpretation:  As documented below in ED Course    Independent Interpretation of Studies:  I independently interpreted: CBC with white blood cell count, mild anemia, normal platelets.  Electrolytes within normal limits, renal function normal, LFTs normal.  UA negative.    Differential diagnoses considered include but are not limited to: Endometritis, intra-abdominal sepsis, pelvic fluid collection    Social Determinants Limiting Care:  Childcare responsibilities    ED Course:  ED Course as of 10/05/23 2156   Thu Oct 05, 2023   0659 EKG obtained at 0539 demonstrate normal sinus rhythm rate of 72, normal axis, normal intervals, no ST segment or T wave signs of ischemia. [SH]      ED Course User Index  [SH] Uday Pagan MD         Diagnoses as of 10/05/23 2156   Lower abdominal pain       MDM:  38-year-old female with history of cervical cancer presenting to the emergency department for evaluation in the setting of cerclage complication and the pelvic fluid collection.  On arrival vital signs stable, patient with moderate amount of pain, initially given Tylenol without improvement splint given morphine for pain she is nontoxic, will consult GYN has evaluated patient prior to miss her antibiotics.  Discussed patient with GYN oncology.  They will come out and evaluate the patient.  Patient signed out at 7 AM pending recommendations from GYN oncology.      Patient seen and discussed with ED attending physician.    Shemar Pagan MD  PGY 3 Emergency Medicine      Procedures ? SmartLinks last updated 10/5/2023 7:02 AM          Uday Pagan MD  Resident  10/05/23 3388

## 2023-10-05 NOTE — PROGRESS NOTES
Patient was handed off to me from the previous team. For full history, physical, and prior ED course, please see previous provider note prior to patient handoff. This is an addendum to the record.    Briefly, this is a 38-year-old female with history of cervical cancer s/p cerclage placement last year who presents to the emergency department for 1 week of abdominal distention and pain which became worse on Sunday.  Was seen at outside hospital with imaging concerning for fluid in the uterus, unclear sterility, and cervical os scarred closed.  Has been febrile at home.  Did not present immediately from outside hospital due to personal matters, now presents for evaluation by gynecology oncology.  Pending evaluation and recommendations at time of signout.    Hospital Course/MDM:  Patient evaluated by gynecology oncology.  They will admit her to their service for further management.    Disposition:  Admit to gynecology oncology    --  Pete Elena MD  Emergency Medicine, PGY-3

## 2023-10-05 NOTE — H&P
History Of Present Illness  Marleni Syed is a 38 y.o. 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    Patient initially presented to [facility] ED on 10/2 for symptoms and was noted be febrile on arrival, WBC 16 with left shift, an imaging was notable for abdominal and pelvic intraperitoneal fluid most pronounced posterior to the uterus including portion which measures 6.5 x 3.1 by 8.6 cm. She was started Zosyn and doxycycline due to concern for endometritis however she left AMA on 10/3 due to childcare difficulties.  She reports some improvement in her pain after discharge however symptoms recurred yesterday evening prompting her to be reevaluated.     Currently, pain is 9/10 and described as a constant pain with intermittent periods of sharp intense pain that starts in the suprapubic area and radiates to the vagina. Denies dizziness, chills, chest pain, SOB, nausea or vomiting, last fever at home on 4 days ago. No vaginal discharge or irritation.    Tumor History:  - 2008- abnormal pap test requiring colpo  - 10/2018- LSIL cannot exclude ASC-H pap during pregnancy   - 9/2022- HSIL cannot exclude invasive carcinoma  - 9/2022- Cervical biopsies with CIN3 with endocervical involvement and invasive SCC (scant, unable to note differentiation)  - PET MRI 10/22: 2cm tumor with no parametrial involvement, no PET avid lesions  - 11/18/22: Exlap, radical trachelectomy, SLND, abdominal cerclage with stage IB2 SCC no LVSI, plan for surveillance, CARES    Past Medical History  - History of molar pregnancy  - Psychogenic non-epileptic seizures   - Anxiety/depression  - Possible rheumatoid arthritis (formerly told she has rheumatoid, not on meds)  - Cervical cancer    Surgical History  - CS x3 (2006, 2008, 2019)  - D&C  "()  - breast augmentation ()    OB/GYN History  , CS x3, 16 week SAB, Molar pregnancy    Social History  15 pack year history of smoking, quit in 2018. Currently Vapes for the last 4 years. Denies a history of alcohol use or recreational drug use.  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 Lives at home with her three children and her partner, Wesley.     Allergies  Patient has no known allergies.    Review of Systems   Constitutional:  Negative for chills.   Respiratory:  Negative for cough, shortness of breath and wheezing.    Cardiovascular:  Negative for chest pain, palpitations and leg swelling.   Gastrointestinal:  Positive for abdominal pain and constipation. Negative for diarrhea, nausea and vomiting.   Genitourinary:  Negative for dysuria and frequency.   Skin:  Negative for rash.   Neurological:  Negative for dizziness.   All other systems reviewed and are negative.       Physical Exam  Constitutional:       General: She is not in acute distress.  HENT:      Head: Normocephalic and atraumatic.      Mouth/Throat:      Mouth: Mucous membranes are moist.   Cardiovascular:      Rate and Rhythm: Normal rate.   Pulmonary:      Effort: Pulmonary effort is normal.   Abdominal:      General: Abdomen is flat. There is no distension.      Palpations: Abdomen is soft.      Tenderness: There is abdominal tenderness (Tenderness in the lower pelvic region).   Musculoskeletal:         General: Normal range of motion.   Skin:     General: Skin is warm and dry.   Neurological:      General: No focal deficit present.      Mental Status: She is alert.   Psychiatric:         Mood and Affect: Mood normal.        Last Recorded Vitals  Blood pressure 113/76, pulse 97, temperature 36.1 °C (97 °F), temperature source Tympanic, resp. rate 16, height 1.651 m (5' 5\"), weight 65.8 kg (145 lb), SpO2 98 %.    Relevant Results    Results for orders placed or performed during the hospital encounter of 10/05/23 (from the past 24 hour(s)) "   CBC with Differential   Result Value Ref Range    WBC 10.3 4.4 - 11.3 x10*3/uL    nRBC 0.0 0.0 - 0.0 /100 WBCs    RBC 4.02 4.00 - 5.20 x10*6/uL    Hemoglobin 11.0 (L) 12.0 - 16.0 g/dL    Hematocrit 32.9 (L) 36.0 - 46.0 %    MCV 82 80 - 100 fL    MCH 27.4 26.0 - 34.0 pg    MCHC 33.4 32.0 - 36.0 g/dL    RDW 14.6 (H) 11.5 - 14.5 %    Platelets 394 150 - 450 x10*3/uL    MPV 10.3 7.5 - 11.5 fL    Neutrophils % 81.4 40.0 - 80.0 %    Immature Granulocytes %, Automated 0.6 0.0 - 0.9 %    Lymphocytes % 10.5 13.0 - 44.0 %    Monocytes % 6.9 2.0 - 10.0 %    Eosinophils % 0.0 0.0 - 6.0 %    Basophils % 0.6 0.0 - 2.0 %    Neutrophils Absolute 8.41 (H) 1.20 - 7.70 x10*3/uL    Immature Granulocytes Absolute, Automated 0.06 0.00 - 0.70 x10*3/uL    Lymphocytes Absolute 1.08 (L) 1.20 - 4.80 x10*3/uL    Monocytes Absolute 0.71 0.10 - 1.00 x10*3/uL    Eosinophils Absolute 0.00 0.00 - 0.70 x10*3/uL    Basophils Absolute 0.06 0.00 - 0.10 x10*3/uL   Comprehensive Metabolic Panel   Result Value Ref Range    Glucose 75 74 - 99 mg/dL    Sodium 137 136 - 145 mmol/L    Potassium 3.9 3.5 - 5.3 mmol/L    Chloride 102 98 - 107 mmol/L    Bicarbonate 25 21 - 32 mmol/L    Anion Gap 14 10 - 20 mmol/L    Urea Nitrogen 10 6 - 23 mg/dL    Creatinine 0.56 0.50 - 1.05 mg/dL    eGFR >90 >60 mL/min/1.73m*2    Calcium 9.5 8.6 - 10.6 mg/dL    Albumin 4.2 3.4 - 5.0 g/dL    Alkaline Phosphatase 60 33 - 110 U/L    Total Protein 7.8 6.4 - 8.2 g/dL    AST 17 9 - 39 U/L    Bilirubin, Total 0.3 0.0 - 1.2 mg/dL    ALT 9 7 - 45 U/L   Lactate   Result Value Ref Range    Lactate 0.5 0.4 - 2.0 mmol/L   APTT   Result Value Ref Range    aPTT 34 27 - 38 seconds   Protime-INR   Result Value Ref Range    Protime 12.1 9.8 - 12.8 seconds    INR 1.1 0.9 - 1.1   Urinalysis with Reflex Microscopic and Culture   Result Value Ref Range    Color, Urine Straw Straw, Yellow    Appearance, Urine Clear Clear    Specific Gravity, Urine 1.008 1.005 - 1.035    pH, Urine 8.0 5.0, 5.5, 6.0,  6.5, 7.0, 7.5, 8.0    Protein, Urine NEGATIVE NEGATIVE mg/dL    Glucose, Urine NEGATIVE NEGATIVE mg/dL    Blood, Urine NEGATIVE NEGATIVE    Ketones, Urine NEGATIVE NEGATIVE mg/dL    Bilirubin, Urine NEGATIVE NEGATIVE    Urobilinogen, Urine <2.0 <2.0 mg/dL    Nitrite, Urine NEGATIVE NEGATIVE    Leukocyte Esterase, Urine NEGATIVE NEGATIVE     US pelvis    Result Date: 10/2/2023  Interpreted By:  Shravan Hanley, STUDY: US PELVIS; ;  10/2/2023 3:13 pm   INDICATION: Signs/Symptoms:pelvis pain.   COMPARISON: None.   ACCESSION NUMBER(S): [id number]   ORDERING CLINICIAN: ANGELA NOONAN   TECHNIQUE: Multiple multiplanar static gray scale, color and spectral waveform sonographic images of the pelvis were obtained. Transabdominal ultrasound was performed.   FINDINGS: UTERUS: The uterus is grossly unremarkable in appearance.   The uterus measures at 10.2 cm in length and 6.4 x 8.0 cm in AP and transverse diameters.   ENDOMETRIUM: A large amount of fluid is seen in the endometrial canal, measuring at up to 2.5 cm in thickness. The endometrial lining is within normal limits for thickness, measuring up to 8 6 mm in total thickness.   RIGHT ADNEXA: The right ovary measures at 4.1 x 2.6 x 2.2 cm.   A cyst is seen within the right ovary, measuring at up to 1.5 x 1.5 x 2.2 cm in diameter.   Normal-appearing color Doppler flow is seen in the right ovary.   No right adnexal mass is identified.   LEFT ADNEXA: The left ovary measures at 3.4 x 1.7 x 3.3 cm.   Normal-appearing color Doppler flow is seen in the left ovary.   No left adnexal masses identified.   CUL DE SAC: No free fluid is identified.       1. Large amount of fluid within the endometrial canal, as above. Clinical correlation is recommended. 2. Endometrial lining within normal limits. 3. Cyst in the right ovary, likely physiologic in nature.   MACRO: None   Signed by: Shravan Hanley 10/2/2023 3:25 PM Dictation workstation:   [id number]    CT abdomen pelvis w IV  contrast    Result Date: 10/2/2023  Interpreted By:  Yunier Arango, STUDY: CT ABDOMEN PELVIS W IV CONTRAST;  10/2/2023 11:56 am   INDICATION:   This is a 38-year-old female past medical history of cervical cancer status postsurgery continues to have uterus and ovaries, patient had a trachelectomy last year she presents emerged department for abdominal pain has been worsening over the past week.  She does have sensation that she needs to urinate but states does not feel urinary tract infection.  She last had a period multiple months ago.  She denies any vaginal discharge.  She reports some nausea but no vomiting.  She has been having bowel movements.  She reports a fever. Her pain is mostly in her suprapubic region that seems to spread to her entire abdomen.   COMPARISON: December 28, 2010 gallbladder ultrasound, January 12, 2016 CT scan abdomen and pelvis, October 28, 2022 PET-CT, October 28, 2022 MRI pelvis June 27, 2020 CT abdomen and pelvis October 28, 2022 MRI pelvis   ACCESSION NUMBER(S): [id number]   ORDERING CLINICIAN: ANGELA NOONAN   TECHNIQUE: CT of the abdomen and pelvis was performed.  Standard contiguous axial images were obtained at 3 mm slice thickness through the abdomen and pelvis. Coronal and sagittal reconstructions at 3 mm slice thickness were performed.   100 mL of Omnipaque 350 were administered intravenously without immediate complication.   FINDINGS: LOWER CHEST: The visualized lung base is unremarkable. The heart is normal in size without pericardial effusion. No pleural effusion is present. Visualized distal esophagus appears normal.   ABDOMEN:   LIVER: Liver is bulky measuring 21 cm length. Normal morphology without suspicious lesion.   BILE DUCTS: Extrahepatic bile duct is prominent. The common bile duct measures 10 mm with smooth distal tapering compared with 5 mm 2016 examination. No detected obstructing mass or calculus. There is dependent density adjacent portion of the  duodenum series 5, image 83 which could be due to un digested orally ingested material, or recently passed gallstones.   GALLBLADDER: The gallbladder is nondistended and without evidence of radiopaque stones.   PANCREAS: The pancreas appears unremarkable without evidence of ductal dilatation or masses.   SPLEEN: The spleen is normal in size without focal lesions.   ADRENAL GLANDS: Within normal limits.   KIDNEYS AND URETERS: The kidneys are normal in size and enhance symmetrically.  No hydroureteronephrosis or nephroureterolithiasis is identified.   PELVIS:   BLADDER: The urinary bladder is decompressed, limited for evaluation. No detected mass or calculus.   REPRODUCTIVE ORGANS: Status post radical trachelectomy. Redemonstrated distension of the endometrial cavity by fluid density nonenhancing material measuring 3 cm AP diameter series 5, image 69 compared with 2.3 cm June 27, 2023 no detected abnormal enhancing uterine mass. Lobulated peripheral enhancing nodule right ovary measuring 2.5 x 1.8 cm most compatible with corpus luteum series 4, image 51   BOWEL: Normal caliber without detected abnormal inflammatory change or mural thickening. No pneumatosis.   VESSELS: The aorta and IVC appear normal. Similar mild prominent caliber of the ovarian and adnexal veins.   PERITONEUM/RETROPERITONEUM/LYMPH NODES: Newly seen abdominal and pelvic intraperitoneal fluid most pronounced posterior to the uterus including portion which measures 6.5 x 3.1 by 8.6 cm with density measurement of simple fluid. There is a enhancing rim along the lateral aspect of the collection bilaterally series 2, image 106. There is no free gas within the peritoneal cavity. There is no detected solid enhancing implant, or lymphadenopathy.   BONES AND ABDOMINAL WALL: No suspicious osseous lesions. Postsurgical changes ventral body wall. Overlapping radiopaque leads and vulvar region metallic density skin ornament.       1.  Newly seen intraperitoneal  fluid most pronounced within the pelvis including peripheral enhancing component of indeterminate cause with differential considerations inflammatory, neoplastic and reactive causes. Laboratory correlation and pathology radiology correlation advised 2. Status post trachelectomy with increased amount of simple appearing fluid within the endometrial canal. 3. Progressive biliary dilation of indeterminate cause. There are dependent densities adjacent portion of the duodenum which may or may not be related, possibly recently passed gallstones versus un digested radiopaque material.     Signed by: Yunier Arango 10/2/2023 12:39 PM Dictation workstation:   [id number]      Assessment/Plan     38 y.o. 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 C/w lack of menses for past few months  - CT and TVUS notable for fluid filled endometrial canal, unclear sterility of the fluid  - On arrival to [place] (10/2): febrile to 38.4 and pt reporting fevers at home. WBC 16.2 -> 10.3. Bcx NGTD x2 days from [place]  - S/p zosyn and doxy at [place]  - Currently afebrile with normal WBC but still with significant fundal tenderness  - Will start PO doxy/flagy  - Will attempt to add on for EUA and D&C tomorrow -> NPO at [facility]    Pain Control  - Scheduled Tylenol, Buprenorphine 8 mg daily, oxycodone prn  - Will consider supportive onc consult if needed  - Antiemetics and bowel regimen ordered    Stage 1B moderately differentiated SCC of the cervix  - s/p radical trachelectomy, SLND, Ex-lap, and abdominal cerclage 11/2022  - fluid filled endometrium as above    Hypothyroidism  - Continue home synthroid    Anxiety/Depression  - Continue Venlafaxine    DVT  prophylaxis  - SCDs    FEN/GI  - reg diet  - replete electrolytes prn    Dispo: Admission to Gyn Onc service for pain control     To be seen and discussed with Dr. Chris Abraham MD

## 2023-10-05 NOTE — HOSPITAL COURSE
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     Patient initially presented to [facility] ED on 10/2 for symptoms and was noted be febrile on arrival, WBC 16 with left shift, an imaging was notable for abdominal and pelvic intraperitoneal fluid most pronounced posterior to the uterus including portion which measures 6.5 x 3.1 by 8.6 cm. She was started Zosyn and doxycycline due to concern for endometritis however she left AMA on 10/3 due to childcare difficulties.  She reports some improvement in her pain after discharge however symptoms recurred yesterday evening prompting her to be reevaluated.      Currently, pain is 9/10 and described as a constant pain with intermittent periods of sharp intense pain that starts in the suprapubic area and radiates to the vagina. Denies dizziness, chills, chest pain, SOB, nausea or vomiting, last fever at home on 4 days ago. No vaginal discharge or irritation.     Tumor History:  - 2008- abnormal pap test requiring colpo  - 10/2018- LSIL cannot exclude ASC-H pap during pregnancy   - 9/2022- HSIL cannot exclude invasive carcinoma  - 9/2022- Cervical biopsies with CIN3 with endocervical involvement and invasive SCC (scant, unable to note differentiation)  - PET MRI 10/22: 2cm tumor with no parametrial involvement, no PET avid lesions  - 11/18/22: Exlap, radical trachelectomy, SLND, abdominal cerclage with stage IB2 SCC no LVSI, plan for surveillance, CARES     Past Medical History  - History of molar pregnancy  - Psychogenic non-epileptic seizures   - Anxiety/depression  - Possible rheumatoid arthritis (formerly told she has rheumatoid, not on meds)  - Cervical cancer     Surgical History  - CS x3 (2006, 2008,  )  - D&C ()  - breast augmentation ()     OB/GYN History  , CS x3, 16 week SAB, Molar pregnancy     Social History  15 pack year history of smoking, quit in 2018. Currently Vapes for the last 4 years. Denies a history of alcohol use or recreational drug use.  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 Lives at home with her three children and her partner, Wesley.     Allergies  Patient has no known allergies.

## 2023-10-05 NOTE — ED TRIAGE NOTES
Pt reports to ED  from [facility] where pt was tranpotred via car for Follow Up. Pt has history of cervical cancer with a Cerclague placed. Pt started experiencing an abdominal pain 1 week ago and progressed to 10/10 pain on Sunday. Pt found to have fluid collection in Uterus regarding Cerclague. Provider Dr. Ruiz through [facility].

## 2023-10-06 ENCOUNTER — ANESTHESIA EVENT (OUTPATIENT)
Dept: OPERATING ROOM | Facility: HOSPITAL | Age: 38
DRG: 745 | End: 2023-10-06

## 2023-10-06 ENCOUNTER — ANESTHESIA (OUTPATIENT)
Dept: OPERATING ROOM | Facility: HOSPITAL | Age: 38
DRG: 745 | End: 2023-10-06

## 2023-10-06 VITALS
OXYGEN SATURATION: 96 % | HEART RATE: 88 BPM | BODY MASS INDEX: 24.16 KG/M2 | HEIGHT: 65 IN | RESPIRATION RATE: 18 BRPM | SYSTOLIC BLOOD PRESSURE: 109 MMHG | WEIGHT: 145 LBS | TEMPERATURE: 98.1 F | DIASTOLIC BLOOD PRESSURE: 83 MMHG

## 2023-10-06 PROBLEM — C53.9 MALIGNANT NEOPLASM OF CERVIX (MULTI): Status: ACTIVE | Noted: 2023-10-05

## 2023-10-06 LAB
ANION GAP SERPL CALC-SCNC: 19 MMOL/L (ref 10–20)
ATRIAL RATE: 72 BPM
BACTERIA BLD CULT: NORMAL
BACTERIA BLD CULT: NORMAL
BUN SERPL-MCNC: 11 MG/DL (ref 6–23)
CALCIUM SERPL-MCNC: 10.1 MG/DL (ref 8.6–10.6)
CHLORIDE SERPL-SCNC: 99 MMOL/L (ref 98–107)
CO2 SERPL-SCNC: 22 MMOL/L (ref 21–32)
CREAT SERPL-MCNC: 0.58 MG/DL (ref 0.5–1.05)
ERYTHROCYTE [DISTWIDTH] IN BLOOD BY AUTOMATED COUNT: 14.7 % (ref 11.5–14.5)
GFR SERPL CREATININE-BSD FRML MDRD: >90 ML/MIN/1.73M*2
GLUCOSE SERPL-MCNC: 62 MG/DL (ref 74–99)
HCT VFR BLD AUTO: 37.3 % (ref 36–46)
HGB BLD-MCNC: 12.5 G/DL (ref 12–16)
MAGNESIUM SERPL-MCNC: 2.01 MG/DL (ref 1.6–2.4)
MCH RBC QN AUTO: 27.5 PG (ref 26–34)
MCHC RBC AUTO-ENTMCNC: 33.5 G/DL (ref 32–36)
MCV RBC AUTO: 82 FL (ref 80–100)
NRBC BLD-RTO: 0 /100 WBCS (ref 0–0)
P AXIS: 60 DEGREES
P OFFSET: 192 MS
P ONSET: 143 MS
PLATELET # BLD AUTO: 510 X10*3/UL (ref 150–450)
PMV BLD AUTO: 10.4 FL (ref 7.5–11.5)
POTASSIUM SERPL-SCNC: 3.4 MMOL/L (ref 3.5–5.3)
PR INTERVAL: 158 MS
Q ONSET: 222 MS
QRS COUNT: 11 BEATS
QRS DURATION: 88 MS
QT INTERVAL: 398 MS
QTC CALCULATION(BAZETT): 435 MS
QTC FREDERICIA: 423 MS
R AXIS: 62 DEGREES
RBC # BLD AUTO: 4.55 X10*6/UL (ref 4–5.2)
SODIUM SERPL-SCNC: 137 MMOL/L (ref 136–145)
T AXIS: 66 DEGREES
T OFFSET: 421 MS
VENTRICULAR RATE: 72 BPM
WBC # BLD AUTO: 11.5 X10*3/UL (ref 4.4–11.3)

## 2023-10-06 PROCEDURE — 7100000009 HC PHASE TWO TIME - INITIAL BASE CHARGE: Performed by: STUDENT IN AN ORGANIZED HEALTH CARE EDUCATION/TRAINING PROGRAM

## 2023-10-06 PROCEDURE — 3700000002 HC GENERAL ANESTHESIA TIME - EACH INCREMENTAL 1 MINUTE: Performed by: STUDENT IN AN ORGANIZED HEALTH CARE EDUCATION/TRAINING PROGRAM

## 2023-10-06 PROCEDURE — 2500000004 HC RX 250 GENERAL PHARMACY W/ HCPCS (ALT 636 FOR OP/ED)

## 2023-10-06 PROCEDURE — 36415 COLL VENOUS BLD VENIPUNCTURE: CPT | Performed by: STUDENT IN AN ORGANIZED HEALTH CARE EDUCATION/TRAINING PROGRAM

## 2023-10-06 PROCEDURE — 2500000004 HC RX 250 GENERAL PHARMACY W/ HCPCS (ALT 636 FOR OP/ED): Performed by: STUDENT IN AN ORGANIZED HEALTH CARE EDUCATION/TRAINING PROGRAM

## 2023-10-06 PROCEDURE — 0UDB7ZZ EXTRACTION OF ENDOMETRIUM, VIA NATURAL OR ARTIFICIAL OPENING: ICD-10-PCS | Performed by: STUDENT IN AN ORGANIZED HEALTH CARE EDUCATION/TRAINING PROGRAM

## 2023-10-06 PROCEDURE — 3700000001 HC GENERAL ANESTHESIA TIME - INITIAL BASE CHARGE: Performed by: STUDENT IN AN ORGANIZED HEALTH CARE EDUCATION/TRAINING PROGRAM

## 2023-10-06 PROCEDURE — 2500000001 HC RX 250 WO HCPCS SELF ADMINISTERED DRUGS (ALT 637 FOR MEDICARE OP)

## 2023-10-06 PROCEDURE — 2500000001 HC RX 250 WO HCPCS SELF ADMINISTERED DRUGS (ALT 637 FOR MEDICARE OP): Performed by: STUDENT IN AN ORGANIZED HEALTH CARE EDUCATION/TRAINING PROGRAM

## 2023-10-06 PROCEDURE — 7100000010 HC PHASE TWO TIME - EACH INCREMENTAL 1 MINUTE: Performed by: STUDENT IN AN ORGANIZED HEALTH CARE EDUCATION/TRAINING PROGRAM

## 2023-10-06 PROCEDURE — 3600000010 HC OR TIME - EACH INCREMENTAL 1 MINUTE - PROCEDURE LEVEL FIVE: Performed by: STUDENT IN AN ORGANIZED HEALTH CARE EDUCATION/TRAINING PROGRAM

## 2023-10-06 PROCEDURE — 2500000004 HC RX 250 GENERAL PHARMACY W/ HCPCS (ALT 636 FOR OP/ED): Performed by: NURSE ANESTHETIST, CERTIFIED REGISTERED

## 2023-10-06 PROCEDURE — 2580000001 HC RX 258 IV SOLUTIONS: Performed by: NURSE ANESTHETIST, CERTIFIED REGISTERED

## 2023-10-06 PROCEDURE — A58120 PR DILATION/CURETTAGE,DIAGNOSTIC: Performed by: NURSE ANESTHETIST, CERTIFIED REGISTERED

## 2023-10-06 PROCEDURE — 85027 COMPLETE CBC AUTOMATED: CPT | Performed by: STUDENT IN AN ORGANIZED HEALTH CARE EDUCATION/TRAINING PROGRAM

## 2023-10-06 PROCEDURE — A4217 STERILE WATER/SALINE, 500 ML: HCPCS | Performed by: STUDENT IN AN ORGANIZED HEALTH CARE EDUCATION/TRAINING PROGRAM

## 2023-10-06 PROCEDURE — 3600000005 HC OR TIME - INITIAL BASE CHARGE - PROCEDURE LEVEL FIVE: Performed by: STUDENT IN AN ORGANIZED HEALTH CARE EDUCATION/TRAINING PROGRAM

## 2023-10-06 PROCEDURE — A58120 PR DILATION/CURETTAGE,DIAGNOSTIC: Performed by: STUDENT IN AN ORGANIZED HEALTH CARE EDUCATION/TRAINING PROGRAM

## 2023-10-06 PROCEDURE — 58120 DILATION AND CURETTAGE: CPT | Performed by: STUDENT IN AN ORGANIZED HEALTH CARE EDUCATION/TRAINING PROGRAM

## 2023-10-06 PROCEDURE — 83735 ASSAY OF MAGNESIUM: CPT | Performed by: STUDENT IN AN ORGANIZED HEALTH CARE EDUCATION/TRAINING PROGRAM

## 2023-10-06 PROCEDURE — 57410 PELVIC EXAMINATION: CPT | Performed by: STUDENT IN AN ORGANIZED HEALTH CARE EDUCATION/TRAINING PROGRAM

## 2023-10-06 PROCEDURE — 80048 BASIC METABOLIC PNL TOTAL CA: CPT | Performed by: STUDENT IN AN ORGANIZED HEALTH CARE EDUCATION/TRAINING PROGRAM

## 2023-10-06 RX ORDER — PROPOFOL 10 MG/ML
INJECTION, EMULSION INTRAVENOUS CONTINUOUS PRN
Status: DISCONTINUED | OUTPATIENT
Start: 2023-10-06 | End: 2023-10-06

## 2023-10-06 RX ORDER — CELECOXIB 200 MG/1
400 CAPSULE ORAL ONCE
Status: COMPLETED | OUTPATIENT
Start: 2023-10-06 | End: 2023-10-06

## 2023-10-06 RX ORDER — ONDANSETRON 4 MG/1
4 TABLET, FILM COATED ORAL EVERY 6 HOURS PRN
Qty: 20 TABLET | Refills: 0 | Status: SHIPPED | OUTPATIENT
Start: 2023-10-06 | End: 2023-10-13

## 2023-10-06 RX ORDER — DEXAMETHASONE SODIUM PHOSPHATE 4 MG/ML
INJECTION, SOLUTION INTRA-ARTICULAR; INTRALESIONAL; INTRAMUSCULAR; INTRAVENOUS; SOFT TISSUE AS NEEDED
Status: DISCONTINUED | OUTPATIENT
Start: 2023-10-06 | End: 2023-10-06

## 2023-10-06 RX ORDER — SCOLOPAMINE TRANSDERMAL SYSTEM 1 MG/1
PATCH, EXTENDED RELEASE TRANSDERMAL
Status: DISCONTINUED
Start: 2023-10-06 | End: 2023-10-06 | Stop reason: HOSPADM

## 2023-10-06 RX ORDER — SODIUM CHLORIDE, SODIUM LACTATE, POTASSIUM CHLORIDE, CALCIUM CHLORIDE 600; 310; 30; 20 MG/100ML; MG/100ML; MG/100ML; MG/100ML
100 INJECTION, SOLUTION INTRAVENOUS CONTINUOUS
Status: DISCONTINUED | OUTPATIENT
Start: 2023-10-06 | End: 2023-10-06 | Stop reason: HOSPADM

## 2023-10-06 RX ORDER — GABAPENTIN 600 MG/1
600 TABLET ORAL ONCE
Status: COMPLETED | OUTPATIENT
Start: 2023-10-06 | End: 2023-10-06

## 2023-10-06 RX ORDER — OXYCODONE HYDROCHLORIDE 5 MG/1
10 TABLET ORAL EVERY 4 HOURS PRN
Status: DISCONTINUED | OUTPATIENT
Start: 2023-10-06 | End: 2023-10-06 | Stop reason: HOSPADM

## 2023-10-06 RX ORDER — ACETAMINOPHEN 325 MG/1
650 TABLET ORAL EVERY 6 HOURS PRN
Qty: 20 TABLET | Refills: 0 | Status: SHIPPED | OUTPATIENT
Start: 2023-10-06 | End: 2023-10-16

## 2023-10-06 RX ORDER — DOXYCYCLINE 100 MG/1
100 CAPSULE ORAL 2 TIMES DAILY
Qty: 14 CAPSULE | Refills: 0 | Status: SHIPPED | OUTPATIENT
Start: 2023-10-06 | End: 2023-10-13

## 2023-10-06 RX ORDER — METRONIDAZOLE 500 MG/1
500 TABLET ORAL 2 TIMES DAILY
Qty: 14 TABLET | Refills: 0 | Status: SHIPPED | OUTPATIENT
Start: 2023-10-06 | End: 2023-10-13

## 2023-10-06 RX ORDER — APREPITANT 40 MG/1
CAPSULE ORAL
Status: DISCONTINUED
Start: 2023-10-06 | End: 2023-10-06 | Stop reason: HOSPADM

## 2023-10-06 RX ORDER — HYDROMORPHONE HYDROCHLORIDE 1 MG/ML
0.2 INJECTION, SOLUTION INTRAMUSCULAR; INTRAVENOUS; SUBCUTANEOUS EVERY 5 MIN PRN
Status: DISCONTINUED | OUTPATIENT
Start: 2023-10-06 | End: 2023-10-06 | Stop reason: HOSPADM

## 2023-10-06 RX ORDER — FENTANYL CITRATE 50 UG/ML
INJECTION, SOLUTION INTRAMUSCULAR; INTRAVENOUS AS NEEDED
Status: DISCONTINUED | OUTPATIENT
Start: 2023-10-06 | End: 2023-10-06

## 2023-10-06 RX ORDER — ACETAMINOPHEN 325 MG/1
975 TABLET ORAL ONCE
Status: COMPLETED | OUTPATIENT
Start: 2023-10-06 | End: 2023-10-06

## 2023-10-06 RX ORDER — ALBUTEROL SULFATE 0.83 MG/ML
2.5 SOLUTION RESPIRATORY (INHALATION) ONCE AS NEEDED
Status: DISCONTINUED | OUTPATIENT
Start: 2023-10-06 | End: 2023-10-06 | Stop reason: HOSPADM

## 2023-10-06 RX ORDER — OXYCODONE HYDROCHLORIDE 5 MG/1
5 TABLET ORAL EVERY 4 HOURS PRN
Status: DISCONTINUED | OUTPATIENT
Start: 2023-10-06 | End: 2023-10-06 | Stop reason: HOSPADM

## 2023-10-06 RX ORDER — ONDANSETRON HYDROCHLORIDE 2 MG/ML
4 INJECTION, SOLUTION INTRAVENOUS ONCE AS NEEDED
Status: DISCONTINUED | OUTPATIENT
Start: 2023-10-06 | End: 2023-10-06 | Stop reason: HOSPADM

## 2023-10-06 RX ORDER — HYDROMORPHONE HYDROCHLORIDE 1 MG/ML
0.5 INJECTION, SOLUTION INTRAMUSCULAR; INTRAVENOUS; SUBCUTANEOUS EVERY 5 MIN PRN
Status: DISCONTINUED | OUTPATIENT
Start: 2023-10-06 | End: 2023-10-06 | Stop reason: HOSPADM

## 2023-10-06 RX ORDER — SODIUM CHLORIDE 0.9 G/100ML
IRRIGANT IRRIGATION AS NEEDED
Status: DISCONTINUED | OUTPATIENT
Start: 2023-10-06 | End: 2023-10-06 | Stop reason: HOSPADM

## 2023-10-06 RX ORDER — POLYETHYLENE GLYCOL 3350 17 G/17G
17 POWDER, FOR SOLUTION ORAL DAILY PRN
Qty: 10 PACKET | Refills: 0 | Status: SHIPPED | OUTPATIENT
Start: 2023-10-06 | End: 2023-10-09

## 2023-10-06 RX ORDER — PROPOFOL 10 MG/ML
INJECTION, EMULSION INTRAVENOUS AS NEEDED
Status: DISCONTINUED | OUTPATIENT
Start: 2023-10-06 | End: 2023-10-06

## 2023-10-06 RX ORDER — MIDAZOLAM HYDROCHLORIDE 1 MG/ML
INJECTION INTRAMUSCULAR; INTRAVENOUS AS NEEDED
Status: DISCONTINUED | OUTPATIENT
Start: 2023-10-06 | End: 2023-10-06

## 2023-10-06 RX ORDER — ACETAMINOPHEN 325 MG/1
650 TABLET ORAL EVERY 4 HOURS PRN
Status: DISCONTINUED | OUTPATIENT
Start: 2023-10-06 | End: 2023-10-06 | Stop reason: HOSPADM

## 2023-10-06 RX ORDER — KETOROLAC TROMETHAMINE 30 MG/ML
INJECTION, SOLUTION INTRAMUSCULAR; INTRAVENOUS AS NEEDED
Status: DISCONTINUED | OUTPATIENT
Start: 2023-10-06 | End: 2023-10-06

## 2023-10-06 RX ADMIN — PROPOFOL 40 MG: 10 INJECTION, EMULSION INTRAVENOUS at 14:19

## 2023-10-06 RX ADMIN — METRONIDAZOLE 500 MG: 500 TABLET ORAL at 08:56

## 2023-10-06 RX ADMIN — DEXAMETHASONE SODIUM PHOSPHATE 4 MG: 4 INJECTION, SOLUTION INTRA-ARTICULAR; INTRALESIONAL; INTRAMUSCULAR; INTRAVENOUS; SOFT TISSUE at 14:31

## 2023-10-06 RX ADMIN — DOXYCYCLINE HYCLATE 100 MG: 100 TABLET, COATED ORAL at 20:16

## 2023-10-06 RX ADMIN — OXYCODONE HYDROCHLORIDE 10 MG: 5 TABLET ORAL at 09:57

## 2023-10-06 RX ADMIN — HYDROMORPHONE HYDROCHLORIDE 0.5 MG: 1 INJECTION, SOLUTION INTRAMUSCULAR; INTRAVENOUS; SUBCUTANEOUS at 15:31

## 2023-10-06 RX ADMIN — KETOROLAC TROMETHAMINE 30 MG: 30 INJECTION, SOLUTION INTRAMUSCULAR at 14:31

## 2023-10-06 RX ADMIN — METRONIDAZOLE 500 MG: 500 TABLET ORAL at 20:15

## 2023-10-06 RX ADMIN — MIDAZOLAM HYDROCHLORIDE 2 MG: 1 INJECTION, SOLUTION INTRAMUSCULAR; INTRAVENOUS at 14:05

## 2023-10-06 RX ADMIN — MIDAZOLAM HYDROCHLORIDE 2 MG: 1 INJECTION, SOLUTION INTRAMUSCULAR; INTRAVENOUS at 14:16

## 2023-10-06 RX ADMIN — OXYCODONE HYDROCHLORIDE 10 MG: 5 TABLET ORAL at 00:12

## 2023-10-06 RX ADMIN — SODIUM CHLORIDE, SODIUM LACTATE, POTASSIUM CHLORIDE, AND CALCIUM CHLORIDE: 600; 310; 30; 20 INJECTION, SOLUTION INTRAVENOUS at 14:01

## 2023-10-06 RX ADMIN — ONDANSETRON 4 MG: 2 INJECTION INTRAMUSCULAR; INTRAVENOUS at 14:31

## 2023-10-06 RX ADMIN — PROPOFOL 100 MCG/KG/MIN: 10 INJECTION, EMULSION INTRAVENOUS at 14:18

## 2023-10-06 RX ADMIN — GABAPENTIN 600 MG: 600 TABLET, FILM COATED ORAL at 10:30

## 2023-10-06 RX ADMIN — DOXYCYCLINE HYCLATE 100 MG: 100 TABLET, COATED ORAL at 08:55

## 2023-10-06 RX ADMIN — OXYCODONE HYDROCHLORIDE 10 MG: 5 TABLET ORAL at 20:14

## 2023-10-06 RX ADMIN — FENTANYL CITRATE 100 MCG: 50 INJECTION, SOLUTION INTRAMUSCULAR; INTRAVENOUS at 14:18

## 2023-10-06 RX ADMIN — OXYCODONE HYDROCHLORIDE 10 MG: 5 TABLET ORAL at 05:54

## 2023-10-06 RX ADMIN — CELECOXIB 400 MG: 200 CAPSULE ORAL at 10:30

## 2023-10-06 RX ADMIN — ACETAMINOPHEN 975 MG: 325 TABLET, FILM COATED ORAL at 10:30

## 2023-10-06 RX ADMIN — IBUPROFEN 600 MG: 600 TABLET, FILM COATED ORAL at 08:59

## 2023-10-06 SDOH — SOCIAL STABILITY: SOCIAL INSECURITY: HAVE YOU HAD THOUGHTS OF HARMING ANYONE ELSE?: NO

## 2023-10-06 SDOH — SOCIAL STABILITY: SOCIAL INSECURITY: POSSIBLE ABUSE REPORTED TO:: ADVOCATE

## 2023-10-06 SDOH — HEALTH STABILITY: MENTAL HEALTH: CURRENT SMOKER: 1

## 2023-10-06 ASSESSMENT — LIFESTYLE VARIABLES
HOW OFTEN DO YOU HAVE 6 OR MORE DRINKS ON ONE OCCASION: NEVER
AUDIT-C TOTAL SCORE: 0
HOW MANY STANDARD DRINKS CONTAINING ALCOHOL DO YOU HAVE ON A TYPICAL DAY: PATIENT DOES NOT DRINK
HOW OFTEN DO YOU HAVE A DRINK CONTAINING ALCOHOL: NEVER
SKIP TO QUESTIONS 9-10: 1
AUDIT-C TOTAL SCORE: 0

## 2023-10-06 ASSESSMENT — ACTIVITIES OF DAILY LIVING (ADL)
FEEDING YOURSELF: INDEPENDENT
TOILETING: INDEPENDENT
DRESSING YOURSELF: INDEPENDENT
GROOMING: INDEPENDENT
FEEDING YOURSELF: INDEPENDENT
HEARING - LEFT EAR: FUNCTIONAL
HEARING - LEFT EAR: FUNCTIONAL
ADEQUATE_TO_COMPLETE_ADL: YES
HEARING - RIGHT EAR: FUNCTIONAL
PATIENT'S MEMORY ADEQUATE TO SAFELY COMPLETE DAILY ACTIVITIES?: YES
WALKS IN HOME: INDEPENDENT
HEARING - RIGHT EAR: FUNCTIONAL
PATIENT'S MEMORY ADEQUATE TO SAFELY COMPLETE DAILY ACTIVITIES?: YES
ADEQUATE_TO_COMPLETE_ADL: YES
BATHING: INDEPENDENT
GROOMING: INDEPENDENT
WALKS IN HOME: INDEPENDENT
JUDGMENT_ADEQUATE_SAFELY_COMPLETE_DAILY_ACTIVITIES: YES
DRESSING YOURSELF: INDEPENDENT
TOILETING: INDEPENDENT
HEARING - RIGHT EAR: FUNCTIONAL
HEARING - LEFT EAR: FUNCTIONAL
JUDGMENT_ADEQUATE_SAFELY_COMPLETE_DAILY_ACTIVITIES: YES

## 2023-10-06 ASSESSMENT — PAIN SCALES - GENERAL
PAINLEVEL_OUTOF10: 9
PAINLEVEL_OUTOF10: 0 - NO PAIN
PAINLEVEL_OUTOF10: 3
PAINLEVEL_OUTOF10: 5 - MODERATE PAIN
PAINLEVEL_OUTOF10: 10 - WORST POSSIBLE PAIN
PAINLEVEL_OUTOF10: 7
PAINLEVEL_OUTOF10: 8
PAINLEVEL_OUTOF10: 10 - WORST POSSIBLE PAIN

## 2023-10-06 ASSESSMENT — PAIN - FUNCTIONAL ASSESSMENT
PAIN_FUNCTIONAL_ASSESSMENT: 0-10

## 2023-10-06 ASSESSMENT — PAIN SCALES - WONG BAKER
WONGBAKER_NUMERICALRESPONSE: HURTS LITTLE BIT
WONGBAKER_NUMERICALRESPONSE: HURTS EVEN MORE

## 2023-10-06 ASSESSMENT — PAIN SCALES - PAIN ASSESSMENT IN ADVANCED DEMENTIA (PAINAD)
BREATHING: NORMAL
BREATHING: OCCASIONAL LABORED BREATHING, SHORT PERIOD OF HYPERVENTILATION

## 2023-10-06 ASSESSMENT — PATIENT HEALTH QUESTIONNAIRE - PHQ9
1. LITTLE INTEREST OR PLEASURE IN DOING THINGS: NOT AT ALL
2. FEELING DOWN, DEPRESSED OR HOPELESS: NOT AT ALL
SUM OF ALL RESPONSES TO PHQ9 QUESTIONS 1 & 2: 0

## 2023-10-06 ASSESSMENT — COGNITIVE AND FUNCTIONAL STATUS - GENERAL: PATIENT BASELINE BEDBOUND: NO

## 2023-10-06 NOTE — ANESTHESIA PREPROCEDURE EVALUATION
Patient: Marleni Syed    Procedure Information       Date/Time: 10/06/23 1105    Procedure: OBGYN D&C Diagnostic    Location: [facility] OR 06 / Virtual [facility] OR    Surgeons: Rocio Garcia MD            Relevant Problems   Anesthesia (within normal limits)      Cardiovascular (within normal limits)      Endocrine   (+) Acquired hypothyroidism      GI (within normal limits)      /Renal (within normal limits)      Neuro/Psych   (+) Depression, major, single episode, mild (CMS/HCC)   (+) Generalized anxiety disorder      Pulmonary (within normal limits)      GI/Hepatic  Abdominal pain      Hematology   (+) Anemia due to blood loss      Musculoskeletal (within normal limits)      Eyes, Ears, Nose, and Throat (within normal limits)      Infectious Disease (within normal limits)       Clinical information reviewed:   Tobacco  Allergies  Meds   Med Hx  Surg Hx  OB Status  Fam Hx  Soc   Hx        NPO Detail:  No data recorded     Physical Exam    Airway  Mallampati: II     Cardiovascular - normal exam     Dental - normal exam     Pulmonary - normal exam     Abdominal - normal exam             Anesthesia Plan    ASA 2     MAC     The patient is a current smoker.

## 2023-10-06 NOTE — ANESTHESIA POSTPROCEDURE EVALUATION
Patient: Marleni Syed    Procedure Summary       Date: 10/06/23 Room / Location: [facility] OR 06 / Virtual [facility] OR    Anesthesia Start: 1401 Anesthesia Stop:     Procedure: Endocervical dilation and pap smear (Uterus) Diagnosis:       Malignant neoplasm of cervix, unspecified site (CMS/HCC)      (Malignant neoplasm of cervix, unspecified site (CMS/HCC) [C53.9])    Surgeons: Rocio Garcia MD Responsible Provider: PIERCE Kendrick    Anesthesia Type: MAC ASA Status: 2            Anesthesia Type: MAC    Vitals Value Taken Time   /76 10/06/23 1511   Temp 36.5 °C (97.7 °F) 10/06/23 1505   Pulse 80 10/06/23 1505   Resp 18 10/06/23 1511   SpO2 100 10/06/23 1511       Anesthesia Post Evaluation    Patient location during evaluation: PACU  Patient participation: complete - patient participated  Level of consciousness: awake and alert  Pain management: adequate  Airway patency: patent  Cardiovascular status: acceptable  Respiratory status: acceptable  Hydration status: acceptable        No notable events documented.

## 2023-10-06 NOTE — CARE PLAN
Problem: Pain  Goal: Takes deep breaths with improved pain control throughout the shift  Outcome: Progressing  Goal: Turns in bed with improved pain control throughout the shift  Outcome: Progressing  Goal: Walks with improved pain control throughout the shift  Outcome: Progressing  Goal: Performs ADL's with improved pain control throughout shift  Outcome: Progressing  Goal: Participates in PT with improved pain control throughout the shift  Outcome: Progressing  Goal: Free from opioid side effects throughout the shift  Outcome: Progressing  Goal: Free from acute confusion related to pain meds throughout the shift  Outcome: Progressing

## 2023-10-06 NOTE — PROGRESS NOTES
"Marleni Syed is a 38 y.o. female on day 1 of admission presenting with Lower abdominal pain.    Subjective          Objective     Physical Exam    Last Recorded Vitals  Blood pressure 110/73, pulse 75, temperature 36.8 °C (98.2 °F), temperature source Temporal, resp. rate 18, height 1.651 m (5' 5\"), weight 65.8 kg (145 lb), SpO2 97 %.  Intake/Output last 3 Shifts:  I/O last 3 completed shifts:  In: - (0 mL/kg)   Out: 400 (6.1 mL/kg) [Urine:400 (0.2 mL/kg/hr)]  Weight: 65.8 kg     Relevant Results                             Assessment/Plan   Principal Problem:    Lower abdominal pain  Active Problems:    Malignant neoplasm of cervix (CMS/HCC)    Patient was an add on for a D&C and evacuation of hematometra.  Pain management. Possible drain.  ADOD 10/8/23. May need HC for drain care.           Candy Gomez RN      "

## 2023-10-06 NOTE — OP NOTE
Endocervical dilation and pap smear Operative Note     Date: 10/6/2023  OR Location: [facility] OR    Name: Marleni Syed : 1985, Age: 38 y.o., MRN: [id number], Sex: female    Diagnosis  Pre-op Diagnosis     * Malignant neoplasm of cervix, unspecified site (CMS/HCC) [C53.9] Post-op Diagnosis     * Malignant neoplasm of cervix, unspecified site (CMS/HCC) [C53.9]     Procedures  Endocervical dilation and pap smear  51167 - [place] DILATION & CURETTAGE DX&/THER NONOBSTETRIC      Surgeons      * Rocio Garcia - Primary    Resident/Fellow/Other Assistant:  Oscar Angel MD    Procedure Summary  Anesthesia: General  ASA: II  Anesthesia Staff: Anesthesiologist: Daniel Sweet DO; Dioni Fajardo MD  CRNA: VANESSA Kendrick-CRNA  C-AA: FALLON Us  Estimated Blood Loss: 5mL  Intra-op Medications: * No intraprocedure medications in log *           Anesthesia Record               Intraprocedure I/O Totals          Intake    .00 mL    Propofol Drip 0.00 mL    The total shown is the total volume documented since Anesthesia Start was filed.    Total Intake 400 mL          Specimen:   ID Type Source Tests Collected by Time   1 : PAP SMEAR ThinPrep CERVIX, DIAGNOSTIC GYNECOLOGIC CYTOLOGY CONSULTATION Rocio Garcia MD 10/6/2023 1447        Staff:   Circulator: Annabelle Alvarado RN  Relief Scrub: Jenelle Pearson  Scrub Person: Alice Inman; Neela Stuart RN         Drains and/or Catheters:   Open Drain Other (Comment) 22 cm (Active)       [REMOVED] Urethral Catheter (Removed)         Findings: Enlarged uterus with fluid-filled endometrial cavity.  Hematometria approximately 100cc drained upon dilation.    Indications: Marleni Syed is an 38 y.o. female who is having surgery for hematometria versus pyometria due to cervical stenosis s/p radical trachelectomy for cervical cancer.    The patient was seen in the preoperative area. The risks, benefits, complications, treatment options,  non-operative alternatives, expected recovery and outcomes were discussed with the patient. The possibilities of reaction to medication, pulmonary aspiration, injury to surrounding structures, bleeding, recurrent infection, the need for additional procedures, failure to diagnose a condition, and creating a complication requiring transfusion or operation were discussed with the patient. The patient concurred with the proposed plan, giving informed consent.  The site of surgery was properly noted/marked if necessary per policy. The patient has been actively warmed in preoperative area. Patient was taking oral doxycycline and flagyl.  Venous thrombosis prophylaxis have been ordered including bilateral sequential compression devices    Procedure Details: Patient was taken to the operating room and preoperative huddle was performed.  She was placed under MAC anesthesia.  She was placed in dorsal lithitomy and and the site was prepared and draped in the normal sterile fashion.  We placed speculum into the vagina.  We identified where the lower uterus met the vagina and placed a tenaculum on the lower uterus.  We identified the cervical/endometrial canal and opened this up with a hemostat.  We used suction to drain the uterus of all hematometria.  We then placed a 22-Georgian meza catheter into the uterus and placed 10cc sterile water into the balloon.  Pap smear was collected.  This concluded the procedure.  All counts were correct, she returned to the recovery room in stable condition.  Complications:  None; patient tolerated the procedure well.    Disposition: PACU - hemodynamically stable.  Condition: stable         Oscar Miller MD      Attending Attestation:     I was present for the entirety of the procedure(s).     MD Rocio Pool  Phone Number: [phone]

## 2023-10-06 NOTE — PROGRESS NOTES
"Marleni Syed is a 38 y.o. female on day 1 of admission presenting with Lower abdominal pain.      Subjective   Pt still with abdominal pain but states it is better controlled on oxycodone. Has been able to ambulate and shower standing. No other complaints at this time.      Objective     Last Recorded Vitals  Blood pressure 105/59, pulse 92, temperature 36 °C (96.8 °F), resp. rate 18, height 1.651 m (5' 5\"), weight 65.8 kg (145 lb), SpO2 98 %.  Intake/Output last 3 Shifts:    Intake/Output Summary (Last 24 hours) at 10/6/2023 0657  Last data filed at 10/6/2023 0327  Gross per 24 hour   Intake --   Output 400 ml   Net -400 ml       Physical Exam  Constitutional:       General: She is not in acute distress.  HENT:      Head: Normocephalic and atraumatic.      Mouth/Throat:      Mouth: Mucous membranes are moist.   Eyes:      Extraocular Movements: Extraocular movements intact.      Pupils: Pupils are equal, round, and reactive to light.   Cardiovascular:      Rate and Rhythm: Normal rate and regular rhythm.   Pulmonary:      Effort: Pulmonary effort is normal.   Abdominal:      Palpations: Abdomen is soft.      Tenderness: There is abdominal tenderness (suprapubic).   Musculoskeletal:         General: Normal range of motion.   Skin:     General: Skin is warm and dry.   Neurological:      General: No focal deficit present.      Mental Status: She is alert.   Psychiatric:         Mood and Affect: Mood normal.         Scheduled medications  doxycycline, 100 mg, oral, q12h DIMA  metroNIDAZOLE, 500 mg, oral, q12h DIMA  polyethylene glycol, 17 g, oral, Daily      Continuous medications     PRN medications  PRN medications: acetaminophen, ibuprofen, ondansetron, oxyCODONE, oxyCODONE        Lab Results   Component Value Date    WBC 10.3 10/05/2023    HGB 11.0 (L) 10/05/2023    HCT 32.9 (L) 10/05/2023     10/05/2023    CHOL 163 06/01/2023    TRIG 96 06/01/2023    HDL 44.9 06/01/2023    ALT 9 10/05/2023    AST 17 " 10/05/2023     10/05/2023    K 3.9 10/05/2023     10/05/2023    CREATININE 0.56 10/05/2023    BUN 10 10/05/2023    CO2 25 10/05/2023    TSH 0.93 06/01/2023    INR 1.1 10/05/2023    HGBA1C 5.9 (A) 06/01/2023     Lab Results   Component Value Date    GLUCOSE 75 10/05/2023    CALCIUM 9.5 10/05/2023     10/05/2023    K 3.9 10/05/2023    CO2 25 10/05/2023     10/05/2023    BUN 10 10/05/2023    CREATININE 0.56 10/05/2023        Assessment/Plan     38 y.o. 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     Fluid filled uterus, c/f endometritis   - Likely 2/2 stenosis in s/o recent trachelectomy and abdominal cerclage. C/w lack of menses for past few months  - CT and TVUS notable for fluid filled endometrial canal, unclear sterility of the fluid  - On arrival to [place] (10/2): febrile to 38.4 and pt reporting fevers at home. WBC 16.2 -> 10.3. Bcx NGTD x2 days from [place]  - S/p zosyn and doxy at [place]  - Currently afebrile with normal WBC but still with significant fundal tenderness  - On PO doxy/flagy  - Will attempt to add on for EUA and D&C today -> NPO     Pain Control  - Controlled with scheduled Tylenol, Buprenorphine 8 mg daily, oxycodone prn  - Antiemetics and bowel regimen ordered     Stage 1B moderately differentiated SCC of the cervix  - s/p radical trachelectomy, SLND, Ex-lap, and abdominal cerclage 11/2022  - fluid filled endometrium as above     Hypothyroidism  - Continue home synthroid     Anxiety/Depression  - Continue Venlafaxine     DVT prophylaxis  - SCDs     FEN/GI  - reg diet  - replete electrolytes prn     Dispo: Admission to Gyn Onc service for pain control    Discussed with Dr. Garcia.  Meryl Angel MD

## 2023-10-07 NOTE — DISCHARGE SUMMARY
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 She underwent D&C and meza catheter was placed in the uterus, please see operative note for more details. Pain was improved following procedure and she remained afebrile during admission. She was discharged home in stable condition with plan for postoperative visit in 2 weeks with Dr. Garcia. Doxycycline and Flagyl was prescribed with instructions to complete a 1 week course of antibiotics.             Discharge Meds     Your medication list        START taking these medications        Instructions Last Dose Given Next Dose Due   acetaminophen 325 mg tablet  Commonly known as: Tylenol      Take 2 tablets (650 mg) by mouth every 6 hours if needed for mild pain (1 - 3) for up to 20 doses.       doxycycline 100 mg capsule  Commonly known as: Vibramycin      Take 1 capsule (100 mg) by mouth 2 times a day for 7 days. Take with at least 8 ounces (large glass) of water, do not lie down for 30 minutes after       metroNIDAZOLE 500 mg tablet  Commonly known as: FlagyL      Take 1 tablet (500 mg) by mouth 2 times a day for 7 days.       ondansetron 4 mg tablet  Commonly known as: Zofran      Take 1 tablet (4 mg) by mouth every 6 hours if needed for nausea for up to 20 doses.       polyethylene glycol 17 gram packet  Commonly known as: Glycolax, Miralax      Take 17 g by mouth once daily as needed (for constipation) for up to 10 doses.              CONTINUE taking these medications        Instructions Last Dose Given Next Dose Due   buprenorphine 8 mg  Commonly known as: Subtex           busPIRone 15 mg  tablet  Commonly known as: Buspar      Take 1 tablet (15 mg) by mouth 3 times a day as needed (anxiety).       mirtazapine 15 mg tablet  Commonly known as: Remeron      Take 1 tablet (15 mg) by mouth once daily at bedtime.       venlafaxine  mg 24 hr capsule  Commonly known as: Effexor-XR      Take 1 capsule (150 mg) by mouth once daily. Take with food.              ASK your doctor about these medications        Instructions Last Dose Given Next Dose Due   levothyroxine 50 mcg tablet  Commonly known as: Synthroid, Levoxyl      Take 1 tablet (50 mcg) by mouth once daily in the morning. Take before meals.                 Where to Get Your Medications        These medications were sent to [facility]/pharmacy #[id number] [address] AT [address] OF [place] [id number]      Phone: [phone]   acetaminophen 325 mg tablet  doxycycline 100 mg capsule  metroNIDAZOLE 500 mg tablet  ondansetron 4 mg tablet  polyethylene glycol 17 gram packet          Test Results Pending At Discharge  Pending Labs       Order Current Status    Extra Urine Gray Tube Collected (10/05/23 0614)    THINPREP PAP TEST Collected (10/06/23 0437)    Urinalysis with Reflex Microscopic and Culture In process            Outpatient Follow-Up  Future Appointments   Date Time Provider Department Center   10/16/2023  4:30 PM Leonie Bhakta MD [id number]           Desiree Abraham MD

## 2023-10-09 ENCOUNTER — PATIENT OUTREACH (OUTPATIENT)
Dept: CARE COORDINATION | Facility: CLINIC | Age: 38
End: 2023-10-09

## 2023-10-09 DIAGNOSIS — C53.1 MALIGNANT NEOPLASM OF EXOCERVIX (MULTI): Primary | ICD-10-CM

## 2023-10-09 NOTE — PROGRESS NOTES
Marleni Syed is a 38 y.o. female on day 0 of admission presenting with No Principal Problem: There is no principal problem currently on the Problem List. Please update the Problem List and refresh..      Subjective   ***       Objective     OB/GYN History  ***          Last Recorded Vitals  There were no vitals taken for this visit.  Intake/Output last 3 Shifts:  [unfilled]    Physical Exam    {If you would like to pull in Medications, type .meds     If you would like to pull in Lab results for the last 24 hours, type .[id number]    If you would like to pull in Imaging results, type .imgrslt :99}      {Link to Stroke Scoring tools - Link :99}       Assessment/Plan     Active Problems:  There are no active Hospital Problems.    ***       {This patient does not have an ACP note on file for this encounter, please fill one out - Advance Care Planning Activity :99}           I spent *** minutes in the professional and overall care of this patient.      Rocio Garcia MD

## 2023-10-09 NOTE — PROGRESS NOTES
Discharge Facility:[facility]  Discharge Diagnosis:Lower Abd pain  Admission Date:10/05/23  Discharge Date: 10/06/23    PCP Appointment Date:  Specialist Appointment Date:   Hospital Encounter and Summary: Linked   See discharge assessment below for further details

## 2023-10-10 PROCEDURE — 87624 HPV HI-RISK TYP POOLED RSLT: CPT | Performed by: STUDENT IN AN ORGANIZED HEALTH CARE EDUCATION/TRAINING PROGRAM

## 2023-10-10 PROCEDURE — 88142 CYTOPATH C/V THIN LAYER: CPT | Mod: TC,GCY,PORLAB | Performed by: STUDENT IN AN ORGANIZED HEALTH CARE EDUCATION/TRAINING PROGRAM

## 2023-10-16 ENCOUNTER — APPOINTMENT (OUTPATIENT)
Dept: PRIMARY CARE | Facility: CLINIC | Age: 38
End: 2023-10-16

## 2023-10-19 ENCOUNTER — PATIENT OUTREACH (OUTPATIENT)
Dept: CARE COORDINATION | Facility: CLINIC | Age: 38
End: 2023-10-19

## 2023-10-19 NOTE — PROGRESS NOTES
Unable to reach patient for call back after patient's follow up appointment with PCP.   TRACIEM with call back number for patient to call if needed   If no voicemail available call attempts x 2 were made to contact the patient to assist with any questions or concerns patient may have.

## 2023-10-20 ENCOUNTER — APPOINTMENT (OUTPATIENT)
Dept: PRIMARY CARE | Facility: CLINIC | Age: 38
End: 2023-10-20

## 2023-10-26 LAB
CYTOLOGY CMNT CVX/VAG CYTO-IMP: NORMAL
HPV HR GENOTYPES PNL CVX NAA+PROBE: NEGATIVE
HPV HR GENOTYPES PNL CVX NAA+PROBE: NEGATIVE
HPV16 DNA SPEC QL NAA+PROBE: NEGATIVE
HPV18 DNA SPEC QL NAA+PROBE: NEGATIVE
LAB AP HISTORY OF MALIGNANCY: NORMAL
LAB AP HPV GENOTYPE QUESTION: YES
LAB AP HPV HR: NORMAL
LAB AP PREVIOUS ABNORMAL HISTORY: NORMAL
LAB AP TREATMENT HISTORY: NORMAL
LABORATORY COMMENT REPORT: NORMAL
LABORATORY COMMENT REPORT: NORMAL
MENSTRUAL HX REPORTED: NORMAL
PATH REPORT.TOTAL CANCER: NORMAL

## 2023-11-02 ENCOUNTER — APPOINTMENT (OUTPATIENT)
Dept: PRIMARY CARE | Facility: CLINIC | Age: 38
End: 2023-11-02

## 2023-11-02 NOTE — PROGRESS NOTES
Patient cancelled appt today due to insurance issue. She has all her medications for now. No new rx needed.

## 2023-11-03 ENCOUNTER — PATIENT OUTREACH (OUTPATIENT)
Dept: CARE COORDINATION | Facility: CLINIC | Age: 38
End: 2023-11-03

## 2023-11-27 ENCOUNTER — APPOINTMENT (OUTPATIENT)
Dept: PRIMARY CARE | Facility: CLINIC | Age: 38
End: 2023-11-27

## 2023-11-30 ENCOUNTER — APPOINTMENT (OUTPATIENT)
Dept: PRIMARY CARE | Facility: CLINIC | Age: 38
End: 2023-11-30

## 2023-12-20 ENCOUNTER — APPOINTMENT (OUTPATIENT)
Dept: PRIMARY CARE | Facility: CLINIC | Age: 38
End: 2023-12-20
Payer: MEDICAID

## 2023-12-21 ENCOUNTER — APPOINTMENT (OUTPATIENT)
Dept: GYNECOLOGIC ONCOLOGY | Facility: CLINIC | Age: 38
End: 2023-12-21

## 2023-12-21 PROBLEM — C53.9 MALIGNANT NEOPLASM OF CERVIX (MULTI): Status: RESOLVED | Noted: 2023-10-05 | Resolved: 2023-12-21

## 2023-12-21 NOTE — PROGRESS NOTES
Patient ID: Marleni Syed is a 38 y.o. female.  Referring Physician: No referring provider defined for this encounter.  Primary Care Provider: Leonie Bhakta MD    Subjective    HPI    PMH:  - History of molar pregnancy  - Psychogenic non-epileptic seizures   - Anxiety/depression  - Possible rheumatoid arthritis (formerly told she has rheumatoid, not on meds)  - Cervical cancer     Past Surgical History:  - CS x3 (, , )  - D&C ()  - breast augmentation ()     Family History:  Otherwise denies a history of gyn related cancers including ovarian, endometrial, breast, pancreas, and GI cancers.      Social History: 15 pack year history of smoking, quit in 2018. Currently Vapes for the last 4 years. Denies a history of alcohol use or recreational  drug use.  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 Lives at home with her three children and her partner, Wesley.     OBGYN History:  The patient is a , CS x3, 16 week SAB, Molar pregnancy.  She has used OCP for a few years.  She has not used HRT.       Screening:  -Pap smear: See tumor history  -Mammogram: NA  -Colonoscopy: NA    Objective    BSA: There is no height or weight on file to calculate BSA.  There were no vitals taken for this visit.     Physical Exam  Constitutional:       Appearance: Normal appearance. She is normal weight.   HENT:      Head: Normocephalic.      Mouth/Throat:      Mouth: Mucous membranes are moist.   Eyes:      Pupils: Pupils are equal, round, and reactive to light.   Cardiovascular:      Rate and Rhythm: Normal rate and regular rhythm.      Heart sounds: No murmur heard.     No friction rub. No gallop.   Pulmonary:      Effort: Pulmonary effort is normal.      Breath sounds: Normal breath sounds.   Abdominal:      General: Abdomen is flat. Bowel sounds are normal.      Palpations: Abdomen is soft.   Genitourinary:     Comments: Normal external genitalia without lesions or masses  Speculum Exam: Smooth vagina without  lesions or masses, surgically absent cervix without, lower uterine segment without lesions or masses   Bimanual examination: Smooth vagina without lesions or masses, smooth CORTNEY without lesions or masses, normal uterus, adnexa   Rectovaginal exam: Smooth rectovaginal septum without lesions or masses   Musculoskeletal:         General: No swelling.   Skin:     General: Skin is warm and dry.   Neurological:      Mental Status: She is alert.         Performance Status:  {ECOG performance status:86338}    Assessment/Plan     Oncology History Overview Note   Tumor History:  - 2008- abnormal pap test requiring colpo  - 10/2018- LSIL cannot exclude ASC-H pap during pregnancy   - 9/2022- HSIL cannot exclude invasive carcinoma  - 9/2022- Cervical biopsies with CIN3 with endocervical involvement and invasive SCC (scant, unable to note differentiation)  - PET MRI 10/22: 2cm tumor with no parametrial involvement, no PET avid lesions  - 11/18/22: Exlap, radical trachelectomy, SLND, abdominal cerclage with stage IB2 SCC no LVSI, plan for surveillance, CARES     Squamous cell carcinoma of cervix, stage 1 (CMS/HCC)   4/2/2023 Initial Diagnosis    Squamous cell carcinoma of cervix, stage 1 (CMS/HCC)     38 y.o.  with squamous cell carcinoma of the cervix, stage IB2 moderately differentiated no LVSI, s/p radical trachelectomy, SLND, Ex-lap, and abdominal cerclage 11/22,  presenting for surveillance   Co-morbid conditions: Anxiety/depression, rheumatoid arthritis?, cervical ca  PS: 0      # Cervical Cancer, squamous cell   - Discussed pathogenesis of HPV related diseases and its relationship to precancers and cancers of the cervix, vagina, vulva, anus, larynx, pharynx  - S/p radical trachelectomy, SLND, abdominal cerclage in 11/22  - Reviewed pathology which showed a stage IB2 SCC of the cervix, negative margins  - Discussed tumor board plan for surveillance   - MRI prior to next visit  - Pap collected during recent hospitalization was  wnl/HPV neg  - Follow-up in 3 months with MRI prior     # Varicose veins  - Referral placed to vascular for assessment and treatment

## 2023-12-27 DIAGNOSIS — F32.0 DEPRESSION, MAJOR, SINGLE EPISODE, MILD (CMS-HCC): ICD-10-CM

## 2023-12-27 RX ORDER — VENLAFAXINE HYDROCHLORIDE 150 MG/1
150 CAPSULE, EXTENDED RELEASE ORAL DAILY
Qty: 90 CAPSULE | Refills: 0 | Status: SHIPPED | OUTPATIENT
Start: 2023-12-27 | End: 2024-03-25 | Stop reason: SDUPTHER

## 2023-12-27 NOTE — TELEPHONE ENCOUNTER
Rx Refill Request Telephone Encounter    Name:  Marleni Syed  :  [id number]  Medication Name:      venlafaxine XR (Effexor-XR) 150 mg 24 hr capsule [[id number]]  1 cap taken daily. Take with food    Patient is totally out of medication                Specific Pharmacy location:  [facility]    Date of last appointment:  2023  Date of next appointment:  2024  Best number to reach patient:  [phone]

## 2024-01-03 ENCOUNTER — PATIENT OUTREACH (OUTPATIENT)
Dept: CARE COORDINATION | Facility: CLINIC | Age: 39
End: 2024-01-03
Payer: MEDICAID

## 2024-01-08 ENCOUNTER — APPOINTMENT (OUTPATIENT)
Dept: PRIMARY CARE | Facility: CLINIC | Age: 39
End: 2024-01-08
Payer: MEDICAID

## 2024-01-10 NOTE — PROGRESS NOTES
Patient ID: Marleni Syed is a 38 y.o. female.  Referring Physician: No referring provider defined for this encounter.  Primary Care Provider: Leonie Bhakta MD    Subjective    HPI    PMH:  - History of molar pregnancy  - Psychogenic non-epileptic seizures   - Anxiety/depression  - Possible rheumatoid arthritis (formerly told she has rheumatoid, not on meds)  - Cervical cancer     Past Surgical History:  - CS x3 (, , )  - D&C ()  - breast augmentation ()     Family History:  Otherwise denies a history of gyn related cancers including ovarian, endometrial, breast, pancreas, and GI cancers.      Social History: 15 pack year history of smoking, quit in 2018. Currently Vapes for the last 4 years. Denies a history of alcohol use or recreational  drug use.  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 Lives at home with her three children and her partner, Wesley.     OBGYN History:  The patient is a , CS x3, 16 week SAB, Molar pregnancy.  She has used OCP for a few years.  She has not used HRT.       Screening:  -Pap smear: See tumor history  -Mammogram: NA  -Colonoscopy: NA    Objective    BSA: There is no height or weight on file to calculate BSA.  There were no vitals taken for this visit.     Physical Exam  Constitutional:       Appearance: Normal appearance. She is normal weight.   HENT:      Head: Normocephalic.      Mouth/Throat:      Mouth: Mucous membranes are moist.   Eyes:      Pupils: Pupils are equal, round, and reactive to light.   Cardiovascular:      Rate and Rhythm: Normal rate and regular rhythm.      Heart sounds: No murmur heard.     No friction rub. No gallop.   Pulmonary:      Effort: Pulmonary effort is normal.      Breath sounds: Normal breath sounds.   Abdominal:      General: Abdomen is flat. Bowel sounds are normal.      Palpations: Abdomen is soft.   Genitourinary:     Comments: Normal external genitalia without lesions or masses  Speculum Exam: Smooth vagina without  lesions or masses, surgically absent cervix without, lower uterine segment without lesions or masses   Bimanual examination: Smooth vagina without lesions or masses, smooth CORTNEY without lesions or masses, normal uterus, adnexa   Rectovaginal exam: Smooth rectovaginal septum without lesions or masses   Musculoskeletal:         General: No swelling.   Skin:     General: Skin is warm and dry.   Neurological:      Mental Status: She is alert.         Performance Status:  {ECOG performance status:12032}    Assessment/Plan     Oncology History Overview Note   Tumor History:  - 2008- abnormal pap test requiring colpo  - 10/2018- LSIL cannot exclude ASC-H pap during pregnancy   - 9/2022- HSIL cannot exclude invasive carcinoma  - 9/2022- Cervical biopsies with CIN3 with endocervical involvement and invasive SCC (scant, unable to note differentiation)  - PET MRI 10/22: 2cm tumor with no parametrial involvement, no PET avid lesions  - 11/18/22: Exlap, radical trachelectomy, SLND, abdominal cerclage with stage IB2 SCC no LVSI, plan for surveillance, CARES     Squamous cell carcinoma of cervix, stage 1 (CMS/HCC)   4/2/2023 Initial Diagnosis    Squamous cell carcinoma of cervix, stage 1 (CMS/HCC)     38 y.o.  with squamous cell carcinoma of the cervix, stage IB2 moderately differentiated no LVSI, s/p radical trachelectomy, SLND, Ex-lap, and abdominal cerclage 11/22,  presenting for surveillance   Co-morbid conditions: Anxiety/depression, rheumatoid arthritis?, cervical ca  PS: 0      # Cervical Cancer, squamous cell   - Discussed pathogenesis of HPV related diseases and its relationship to precancers and cancers of the cervix, vagina, vulva, anus, larynx, pharynx  - S/p radical trachelectomy, SLND, abdominal cerclage in 11/22  - Reviewed pathology which showed a stage IB2 SCC of the cervix, negative margins  - Discussed tumor board plan for surveillance   - MRI prior to next visit  - Pap collected during recent hospitalization was  wnl/HPV neg  - Follow-up in 3 months with MRI prior     # Varicose veins  - Referral placed to vascular for assessment and treatment

## 2024-01-11 ENCOUNTER — APPOINTMENT (OUTPATIENT)
Dept: GYNECOLOGIC ONCOLOGY | Facility: CLINIC | Age: 39
End: 2024-01-11
Payer: MEDICAID

## 2024-01-11 DIAGNOSIS — C53.9: Primary | ICD-10-CM

## 2024-01-24 ENCOUNTER — APPOINTMENT (OUTPATIENT)
Dept: PRIMARY CARE | Facility: CLINIC | Age: 39
End: 2024-01-24
Payer: MEDICAID

## 2024-01-24 RX ORDER — ONDANSETRON 4 MG/1
4 TABLET, FILM COATED ORAL EVERY 8 HOURS PRN
COMMUNITY
Start: 2023-12-28

## 2024-01-25 ENCOUNTER — APPOINTMENT (OUTPATIENT)
Dept: RADIOLOGY | Facility: CLINIC | Age: 39
End: 2024-01-25
Payer: MEDICAID

## 2024-02-07 ENCOUNTER — APPOINTMENT (OUTPATIENT)
Dept: PRIMARY CARE | Facility: CLINIC | Age: 39
End: 2024-02-07
Payer: MEDICAID

## 2024-02-14 ENCOUNTER — HOSPITAL ENCOUNTER (OUTPATIENT)
Dept: RADIOLOGY | Facility: HOSPITAL | Age: 39
End: 2024-02-14
Payer: MEDICAID

## 2024-02-21 ENCOUNTER — APPOINTMENT (OUTPATIENT)
Dept: PRIMARY CARE | Facility: CLINIC | Age: 39
End: 2024-02-21
Payer: MEDICAID

## 2024-02-23 ENCOUNTER — HOSPITAL ENCOUNTER (OUTPATIENT)
Dept: RADIOLOGY | Facility: HOSPITAL | Age: 39
Discharge: HOME | End: 2024-02-23
Payer: MEDICAID

## 2024-02-23 DIAGNOSIS — C53.9: ICD-10-CM

## 2024-02-23 RX ORDER — GADOTERATE MEGLUMINE 376.9 MG/ML
0.2 INJECTION INTRAVENOUS
Status: DISCONTINUED | OUTPATIENT
Start: 2024-02-23 | End: 2024-02-23

## 2024-02-28 ENCOUNTER — APPOINTMENT (OUTPATIENT)
Dept: PRIMARY CARE | Facility: CLINIC | Age: 39
End: 2024-02-28
Payer: MEDICAID

## 2024-03-06 ENCOUNTER — TELEPHONE (OUTPATIENT)
Dept: GYNECOLOGIC ONCOLOGY | Facility: HOSPITAL | Age: 39
End: 2024-03-06
Payer: MEDICAID

## 2024-03-06 DIAGNOSIS — C53.9: Primary | ICD-10-CM

## 2024-03-14 ENCOUNTER — APPOINTMENT (OUTPATIENT)
Dept: PRIMARY CARE | Facility: CLINIC | Age: 39
End: 2024-03-14
Payer: MEDICAID

## 2024-03-21 ENCOUNTER — APPOINTMENT (OUTPATIENT)
Dept: PRIMARY CARE | Facility: CLINIC | Age: 39
End: 2024-03-21
Payer: MEDICAID

## 2024-03-25 ENCOUNTER — TELEPHONE (OUTPATIENT)
Dept: PRIMARY CARE | Facility: CLINIC | Age: 39
End: 2024-03-25
Payer: MEDICAID

## 2024-03-25 DIAGNOSIS — F32.0 DEPRESSION, MAJOR, SINGLE EPISODE, MILD (CMS-HCC): ICD-10-CM

## 2024-03-25 RX ORDER — VENLAFAXINE HYDROCHLORIDE 150 MG/1
150 CAPSULE, EXTENDED RELEASE ORAL DAILY
Qty: 30 CAPSULE | Refills: 0 | Status: SHIPPED | OUTPATIENT
Start: 2024-03-25 | End: 2024-04-25 | Stop reason: SDUPTHER

## 2024-03-25 NOTE — TELEPHONE ENCOUNTER
Rx Refill Request Telephone Encounter    Name:  Marleni Syed  :  [id number]  Medication Name:  venlafaxine XR (Effexor-XR) 150 mg 24 hr capsule        Take 1 capsule (150 mg) by mouth once daily. Take with food.       Specific Pharmacy location:  [facility]  Date of last appointment:    Date of next appointment:  4/10  Best number to reach patient:  [phone]

## 2024-04-10 ENCOUNTER — APPOINTMENT (OUTPATIENT)
Dept: PRIMARY CARE | Facility: CLINIC | Age: 39
End: 2024-04-10
Payer: MEDICAID

## 2024-04-22 ENCOUNTER — TELEPHONE (OUTPATIENT)
Dept: PRIMARY CARE | Facility: CLINIC | Age: 39
End: 2024-04-22
Payer: MEDICAID

## 2024-04-22 NOTE — TELEPHONE ENCOUNTER
Rx Refill Request Telephone Encounter    Name:  Marleni Syed  :  [id number]  Medication Name:      venlafaxine XR (Effexor-XR) 150 mg 24 hr capsule  Route: Take 1 capsule (150 mg) by mouth once daily. Take with food.       Specific Pharmacy location:  [facility]/pharmacy #[id number] - [address] AT [facility]   Date of last appointment:  23  Date of next appointment: 24   Best number to reach patient: [phone]

## 2024-04-25 ENCOUNTER — OFFICE VISIT (OUTPATIENT)
Dept: PRIMARY CARE | Facility: CLINIC | Age: 39
End: 2024-04-25
Payer: MEDICAID

## 2024-04-25 ENCOUNTER — LAB (OUTPATIENT)
Dept: LAB | Facility: LAB | Age: 39
End: 2024-04-25
Payer: MEDICAID

## 2024-04-25 VITALS
TEMPERATURE: 98 F | DIASTOLIC BLOOD PRESSURE: 82 MMHG | WEIGHT: 153.6 LBS | OXYGEN SATURATION: 94 % | BODY MASS INDEX: 25.59 KG/M2 | HEART RATE: 100 BPM | HEIGHT: 65 IN | SYSTOLIC BLOOD PRESSURE: 117 MMHG | RESPIRATION RATE: 16 BRPM

## 2024-04-25 DIAGNOSIS — E03.9 ACQUIRED HYPOTHYROIDISM: ICD-10-CM

## 2024-04-25 DIAGNOSIS — Z87.891 FORMER SMOKER: ICD-10-CM

## 2024-04-25 DIAGNOSIS — F41.1 GENERALIZED ANXIETY DISORDER: ICD-10-CM

## 2024-04-25 DIAGNOSIS — E16.2 HYPOGLYCEMIA: ICD-10-CM

## 2024-04-25 DIAGNOSIS — Z23 NEED FOR VACCINATION: ICD-10-CM

## 2024-04-25 DIAGNOSIS — F32.0 DEPRESSION, MAJOR, SINGLE EPISODE, MILD (CMS-HCC): ICD-10-CM

## 2024-04-25 DIAGNOSIS — Z00.00 WELL ADULT EXAM: Primary | ICD-10-CM

## 2024-04-25 DIAGNOSIS — R73.09 ELEVATED HEMOGLOBIN A1C: ICD-10-CM

## 2024-04-25 DIAGNOSIS — R53.83 OTHER FATIGUE: ICD-10-CM

## 2024-04-25 DIAGNOSIS — D50.0 ANEMIA DUE TO BLOOD LOSS: ICD-10-CM

## 2024-04-25 LAB
ALBUMIN SERPL BCP-MCNC: 4.6 G/DL (ref 3.4–5)
ALP SERPL-CCNC: 43 U/L (ref 33–110)
ALT SERPL W P-5'-P-CCNC: 7 U/L (ref 7–45)
ANION GAP SERPL CALC-SCNC: 11 MMOL/L (ref 10–20)
AST SERPL W P-5'-P-CCNC: 15 U/L (ref 9–39)
BILIRUB SERPL-MCNC: 0.3 MG/DL (ref 0–1.2)
BUN SERPL-MCNC: 14 MG/DL (ref 6–23)
CALCIUM SERPL-MCNC: 9.5 MG/DL (ref 8.6–10.3)
CHLORIDE SERPL-SCNC: 103 MMOL/L (ref 98–107)
CO2 SERPL-SCNC: 27 MMOL/L (ref 21–32)
CREAT SERPL-MCNC: 0.7 MG/DL (ref 0.5–1.05)
EGFRCR SERPLBLD CKD-EPI 2021: >90 ML/MIN/1.73M*2
FERRITIN SERPL-MCNC: 23 NG/ML (ref 8–150)
GLUCOSE SERPL-MCNC: 73 MG/DL (ref 74–99)
IRON SATN MFR SERPL: 6 % (ref 25–45)
IRON SERPL-MCNC: 28 UG/DL (ref 35–150)
POTASSIUM SERPL-SCNC: 3.3 MMOL/L (ref 3.5–5.3)
PROT SERPL-MCNC: 7.7 G/DL (ref 6.4–8.2)
SODIUM SERPL-SCNC: 138 MMOL/L (ref 136–145)
TIBC SERPL-MCNC: 452 UG/DL (ref 240–445)
UIBC SERPL-MCNC: 424 UG/DL (ref 110–370)

## 2024-04-25 PROCEDURE — 82728 ASSAY OF FERRITIN: CPT

## 2024-04-25 PROCEDURE — 83540 ASSAY OF IRON: CPT

## 2024-04-25 PROCEDURE — 36415 COLL VENOUS BLD VENIPUNCTURE: CPT

## 2024-04-25 PROCEDURE — 83036 HEMOGLOBIN GLYCOSYLATED A1C: CPT

## 2024-04-25 PROCEDURE — 80053 COMPREHEN METABOLIC PANEL: CPT

## 2024-04-25 PROCEDURE — 83525 ASSAY OF INSULIN: CPT

## 2024-04-25 PROCEDURE — 83550 IRON BINDING TEST: CPT

## 2024-04-25 PROCEDURE — 99395 PREV VISIT EST AGE 18-39: CPT | Performed by: FAMILY MEDICINE

## 2024-04-25 RX ORDER — LEVOTHYROXINE SODIUM 50 UG/1
50 TABLET ORAL
Qty: 90 TABLET | Refills: 3 | Status: SHIPPED | OUTPATIENT
Start: 2024-04-25 | End: 2025-04-25

## 2024-04-25 RX ORDER — MIRTAZAPINE 30 MG/1
30 TABLET, FILM COATED ORAL NIGHTLY
Qty: 30 TABLET | Refills: 2 | Status: SHIPPED | OUTPATIENT
Start: 2024-04-25 | End: 2024-07-24

## 2024-04-25 RX ORDER — BUSPIRONE HYDROCHLORIDE 15 MG/1
15 TABLET ORAL 3 TIMES DAILY
Qty: 270 TABLET | Refills: 0 | Status: SHIPPED | OUTPATIENT
Start: 2024-04-25 | End: 2024-07-24

## 2024-04-25 RX ORDER — VENLAFAXINE HYDROCHLORIDE 150 MG/1
150 CAPSULE, EXTENDED RELEASE ORAL DAILY
Qty: 30 CAPSULE | Refills: 0 | Status: SHIPPED | OUTPATIENT
Start: 2024-04-25 | End: 2024-05-24 | Stop reason: SDUPTHER

## 2024-04-25 ASSESSMENT — ENCOUNTER SYMPTOMS
NAUSEA: 0
BLOOD IN STOOL: 0
NERVOUS/ANXIOUS: 0
JOINT SWELLING: 0
PALPITATIONS: 0
ACTIVITY CHANGE: 0
SHORTNESS OF BREATH: 0
OCCASIONAL FEELINGS OF UNSTEADINESS: 0
SEIZURES: 0
DYSPHORIC MOOD: 0
TROUBLE SWALLOWING: 0
UNEXPECTED WEIGHT CHANGE: 0
CONFUSION: 0
LOSS OF SENSATION IN FEET: 0
DIARRHEA: 0
POLYDIPSIA: 0
DIFFICULTY URINATING: 0
FATIGUE: 0
CONSTIPATION: 0
VOMITING: 0
HEADACHES: 0
COUGH: 0
WHEEZING: 0
DEPRESSION: 1
ABDOMINAL PAIN: 0
APPETITE CHANGE: 0
ARTHRALGIAS: 0
POLYPHAGIA: 0

## 2024-04-25 ASSESSMENT — PATIENT HEALTH QUESTIONNAIRE - PHQ9
5. POOR APPETITE OR OVEREATING: SEVERAL DAYS
4. FEELING TIRED OR HAVING LITTLE ENERGY: NEARLY EVERY DAY
SUM OF ALL RESPONSES TO PHQ9 QUESTIONS 1 AND 2: 5
10. IF YOU CHECKED OFF ANY PROBLEMS, HOW DIFFICULT HAVE THESE PROBLEMS MADE IT FOR YOU TO DO YOUR WORK, TAKE CARE OF THINGS AT HOME, OR GET ALONG WITH OTHER PEOPLE: EXTREMELY DIFFICULT
SUM OF ALL RESPONSES TO PHQ QUESTIONS 1-9: 17
8. MOVING OR SPEAKING SO SLOWLY THAT OTHER PEOPLE COULD HAVE NOTICED. OR THE OPPOSITE, BEING SO FIGETY OR RESTLESS THAT YOU HAVE BEEN MOVING AROUND A LOT MORE THAN USUAL: SEVERAL DAYS
6. FEELING BAD ABOUT YOURSELF - OR THAT YOU ARE A FAILURE OR HAVE LET YOURSELF OR YOUR FAMILY DOWN: SEVERAL DAYS
1. LITTLE INTEREST OR PLEASURE IN DOING THINGS: NEARLY EVERY DAY
7. TROUBLE CONCENTRATING ON THINGS, SUCH AS READING THE NEWSPAPER OR WATCHING TELEVISION: NEARLY EVERY DAY
3. TROUBLE FALLING OR STAYING ASLEEP OR SLEEPING TOO MUCH: NEARLY EVERY DAY
2. FEELING DOWN, DEPRESSED OR HOPELESS: MORE THAN HALF THE DAYS
9. THOUGHTS THAT YOU WOULD BE BETTER OFF DEAD, OR OF HURTING YOURSELF: NOT AT ALL

## 2024-04-25 ASSESSMENT — ANXIETY QUESTIONNAIRES
IF YOU CHECKED OFF ANY PROBLEMS ON THIS QUESTIONNAIRE, HOW DIFFICULT HAVE THESE PROBLEMS MADE IT FOR YOU TO DO YOUR WORK, TAKE CARE OF THINGS AT HOME, OR GET ALONG WITH OTHER PEOPLE: SOMEWHAT DIFFICULT
5. BEING SO RESTLESS THAT IT IS HARD TO SIT STILL: NEARLY EVERY DAY
4. TROUBLE RELAXING: NEARLY EVERY DAY
1. FEELING NERVOUS, ANXIOUS, OR ON EDGE: NEARLY EVERY DAY
GAD7 TOTAL SCORE: 19
3. WORRYING TOO MUCH ABOUT DIFFERENT THINGS: NEARLY EVERY DAY
6. BECOMING EASILY ANNOYED OR IRRITABLE: SEVERAL DAYS
2. NOT BEING ABLE TO STOP OR CONTROL WORRYING: NEARLY EVERY DAY
7. FEELING AFRAID AS IF SOMETHING AWFUL MIGHT HAPPEN: NEARLY EVERY DAY

## 2024-04-25 ASSESSMENT — COLUMBIA-SUICIDE SEVERITY RATING SCALE - C-SSRS
1. IN THE PAST MONTH, HAVE YOU WISHED YOU WERE DEAD OR WISHED YOU COULD GO TO SLEEP AND NOT WAKE UP?: NO
6. HAVE YOU EVER DONE ANYTHING, STARTED TO DO ANYTHING, OR PREPARED TO DO ANYTHING TO END YOUR LIFE?: NO
2. HAVE YOU ACTUALLY HAD ANY THOUGHTS OF KILLING YOURSELF?: NO

## 2024-04-25 NOTE — PROGRESS NOTES
Subjective   Patient ID: Marleni Syed is a 38 y.o. female who presents for Annual Exam (Annual exam).    Here to follow up and have physical    Hypothyroid - on Levothyroxine 50 mcg. Did not get labs done til this afternoon. Still losing lot of hair. Overdue for labs. Tired all the time but cannot sleep.     Anxiety - on Venlafaxine  and Mirtazapine 15 mg daily. HX depression. She sometimes wakes up at night due to being uncomfortable. If has to urinate cannot get back to sleep when wakes up. Anxiety is still bad, worse at night. Depression is still bad. Motivation is poor. Feels unmotivated, overwhelmed at times. Sad a lot. Sometimes feels hopeless. Kids help her get out of bed.     Elevated A1C times 2, Repeat done today.     Hx opiod dependece on Buprenorphine.     Cervical cancer, post trachelectomy. Behind on scans. Has one next week.     Here for annual wellness exam. Last wellness over year ago.     New concerns:Anxiety worse, Depression worse.   Feels: poorly    Changes  to health/medications since last visit see above  Other providers seen since last visit Oncology, Gyn  Periods: consistently irregular  Contraception: no    Healthy lifestyle habits: Regular exercise: No                                         Dietary habits: not motivated                                        Social connections/relationships good                                        Wears seatbelts Yes                                         Sunscreen Yes                                         Sexual Risk Factors No        Health screenings:  Pap smear: 2021 - Cervical Cancer, Treated by GYN. Not going back to same place after miscarriage. She is wanting to go somewhere new                                  Mammogram not applicable                                  Self-breast Exam Yes                                   Colon screening not applicable                                  Dexa not applicable                                   Hep C screening Yes                                   HIV screening yes                                  STI screeningnot applicable                          Health risks: Domestic Violence no                      Work-life balance Lost job due to illness                      Smoke detectors Yes                       Carbon monoxide detectors n/a                    Gun safety not applicable                      Second-hand Smoke No                       Occupational Risks no                      Former smoker - quit during pregnancies, quit for good in 2019  Immunizations:  Influenza:  No                             Tdap: 4/2/2019                            HPV: not applicable                           Pneumonia: not applicable                           Shingrix: not applicable                           COVID: Times 2,                    Current Outpatient Medications:     buprenorphine (Subtex) 8 mg, Take 1 tab SL 6 days per week then 3/4 1 day per week, Disp: , Rfl:     busPIRone (Buspar) 15 mg tablet, Take 1 tablet (15 mg) by mouth 3 times a day., Disp: 270 tablet, Rfl: 0    levothyroxine (Synthroid, Levoxyl) 50 mcg tablet, Take 1 tablet (50 mcg) by mouth once daily in the morning. Take before meals., Disp: 90 tablet, Rfl: 3    mirtazapine (Remeron) 30 mg tablet, Take 1 tablet (30 mg) by mouth once daily at bedtime., Disp: 30 tablet, Rfl: 2    ondansetron (Zofran) 4 mg tablet, Take 1 tablet (4 mg) by mouth every 8 hours if needed for vomiting or nausea., Disp: , Rfl:     venlafaxine XR (Effexor-XR) 150 mg 24 hr capsule, Take 1 capsule (150 mg) by mouth once daily. Take with food., Disp: 30 capsule, Rfl: 0    Patient Active Problem List   Diagnosis    Generalized anxiety disorder    Depression, major, single episode, mild (CMS-HCC)    Insomnia due to other mental disorder    Former smoker    Opioid dependence on maintenance agonist therapy, no symptoms (Multi)    Pain, joint, multiple sites    Squamous cell  "carcinoma of cervix, stage 1 (Multi)    Weight gain    Other fatigue    Elevated hemoglobin A1c    Acquired hypothyroidism    Anemia due to blood loss    Puerperal endometritis, postpartum condition or complication (HHS-HCC)    Lower abdominal pain         Review of Systems   Constitutional:  Negative for activity change, appetite change, fatigue and unexpected weight change.   HENT:  Negative for dental problem, hearing loss and trouble swallowing.    Eyes:  Negative for visual disturbance.   Respiratory:  Negative for cough, shortness of breath and wheezing.    Cardiovascular:  Negative for chest pain, palpitations and leg swelling.   Gastrointestinal:  Negative for abdominal pain, blood in stool, constipation, diarrhea, nausea and vomiting.   Endocrine: Negative for cold intolerance, heat intolerance, polydipsia, polyphagia and polyuria.   Genitourinary:  Negative for difficulty urinating and menstrual problem.   Musculoskeletal:  Negative for arthralgias and joint swelling.   Neurological:  Negative for seizures, syncope and headaches.   Psychiatric/Behavioral:  Negative for confusion and dysphoric mood. The patient is not nervous/anxious.        Objective   /82 (BP Location: Right arm, Patient Position: Sitting, BP Cuff Size: Adult)   Pulse 100   Temp 36.7 °C (98 °F) (Temporal)   Resp 16   Ht 1.651 m (5' 5\")   Wt 69.7 kg (153 lb 9.6 oz)   SpO2 94%   BMI 25.56 kg/m²     Physical Exam  Constitutional:       Appearance: Normal appearance.   HENT:      Head: Normocephalic and atraumatic.      Right Ear: Tympanic membrane normal.      Left Ear: Tympanic membrane normal.      Nose: Nose normal.      Mouth/Throat:      Pharynx: Oropharynx is clear.   Eyes:      Extraocular Movements: Extraocular movements intact.      Conjunctiva/sclera: Conjunctivae normal.      Pupils: Pupils are equal, round, and reactive to light.   Neck:      Vascular: No carotid bruit.   Cardiovascular:      Rate and Rhythm: Normal " rate and regular rhythm.      Pulses: Normal pulses.      Heart sounds: No murmur heard.  Pulmonary:      Effort: Pulmonary effort is normal.      Breath sounds: Normal breath sounds.   Abdominal:      Palpations: There is no hepatomegaly, splenomegaly or mass.      Tenderness: There is no abdominal tenderness.   Musculoskeletal:         General: Normal range of motion.      Cervical back: Normal range of motion.      Right lower leg: No edema.      Left lower leg: No edema.   Skin:     General: Skin is warm and dry.      Findings: No rash.   Neurological:      General: No focal deficit present.      Mental Status: She is alert. Mental status is at baseline.      Gait: Gait is intact.   Psychiatric:         Mood and Affect: Mood normal.         Thought Content: Thought content normal.         Assessment/Plan   Problem List Items Addressed This Visit       Generalized anxiety disorder     Still with lot of symptoms. She will start taking Buspirone 15 mg 3 times a day every day. Follow up in one month.          Relevant Medications    busPIRone (Buspar) 15 mg tablet    Depression, major, single episode, mild (CMS-HCC)     On Venlafaxine 150 mg daily. She is not interested in different medication. Will increase Mirtazapine to 30 mg daily. Follow up one month.          Relevant Medications    venlafaxine XR (Effexor-XR) 150 mg 24 hr capsule    mirtazapine (Remeron) 30 mg tablet    Former smoker     Not smoking but still vaping.          Other fatigue     Labs pending.          Elevated hemoglobin A1c     Repeat labs pending.          Acquired hypothyroidism     On Lvothyroxine 50 mcg daily. Lab not resulted yet. Will see if dose needs adjusted once back and do refills.          Relevant Medications    levothyroxine (Synthroid, Levoxyl) 50 mcg tablet    Anemia due to blood loss     She has not been bleeding lately. She has repeat lab ordered.           Other Visit Diagnoses       Well adult exam    -  Primary     Discussed healthy lifestyle, health maintenance for age,    Need for vaccination        Due for COVID booster.              Assessment, plans, tests, and follow up discussed with patient and patient verbalized understanding. Marleni was given an opportunity to ask questions and  any concerns were addressed including but not limited to medication, follow up, preventive measures.

## 2024-04-25 NOTE — PATIENT INSTRUCTIONS
Increased Mirtazopine to 30 mg daily.     Buspirone 15 mg 3 times a day    Continue Venlafaxine.     Follow up in one month. Javier is ok.     Get COVID booster at pharmacy

## 2024-04-26 ENCOUNTER — TELEPHONE (OUTPATIENT)
Dept: PRIMARY CARE | Facility: CLINIC | Age: 39
End: 2024-04-26
Payer: MEDICAID

## 2024-04-26 DIAGNOSIS — R73.09 ELEVATED HEMOGLOBIN A1C: Primary | ICD-10-CM

## 2024-04-26 DIAGNOSIS — D50.0 ANEMIA DUE TO BLOOD LOSS: ICD-10-CM

## 2024-04-26 LAB
EST. AVERAGE GLUCOSE BLD GHB EST-MCNC: 111 MG/DL
HBA1C MFR BLD: 5.5 %
INSULIN P FAST SERPL-ACNC: 6 UIU/ML (ref 3–25)

## 2024-04-26 NOTE — ASSESSMENT & PLAN NOTE
On Venlafaxine 150 mg daily. She is not interested in different medication. Will increase Mirtazapine to 30 mg daily. Follow up one month.

## 2024-04-26 NOTE — ASSESSMENT & PLAN NOTE
Still with lot of symptoms. She will start taking Buspirone 15 mg 3 times a day every day. Follow up in one month.

## 2024-04-26 NOTE — TELEPHONE ENCOUNTER
Potassium is a little low. Not enough to add a medicine for. Needs potassium rich foods in diet. IF she needs handout, we may be able to find it in patient instructions. She can get it off the internet.     Also low on iron. Need to add Ferrous sulfate 325 mg once a day. Can repeat lab in 3-6 months.    A1C down to 5.5 which is great.  Was 5.9 before. Repeat in 6 months    Fasting insulin level is low normal at 6 so is fine. Does not support metabolic syndrome diagnosis. Keep working on healthy lifestyle. Repeat  labs in 6 months are ordered

## 2024-04-26 NOTE — ASSESSMENT & PLAN NOTE
On Lvothyroxine 50 mcg daily. Lab not resulted yet. Will see if dose needs adjusted once back and do refills.

## 2024-05-23 ENCOUNTER — ANESTHESIA (OUTPATIENT)
Dept: RADIOLOGY | Facility: HOSPITAL | Age: 39
End: 2024-05-23
Payer: MEDICAID

## 2024-05-23 ENCOUNTER — ANESTHESIA EVENT (OUTPATIENT)
Dept: RADIOLOGY | Facility: HOSPITAL | Age: 39
End: 2024-05-23

## 2024-05-24 ENCOUNTER — TELEPHONE (OUTPATIENT)
Dept: PRIMARY CARE | Facility: CLINIC | Age: 39
End: 2024-05-24
Payer: MEDICAID

## 2024-05-24 DIAGNOSIS — F32.0 DEPRESSION, MAJOR, SINGLE EPISODE, MILD (CMS-HCC): ICD-10-CM

## 2024-05-24 RX ORDER — VENLAFAXINE HYDROCHLORIDE 150 MG/1
150 CAPSULE, EXTENDED RELEASE ORAL DAILY
Qty: 30 CAPSULE | Refills: 0 | Status: SHIPPED | OUTPATIENT
Start: 2024-05-24 | End: 2024-06-23

## 2024-05-24 NOTE — TELEPHONE ENCOUNTER
Rx Refill Request Telephone Encounter    Name:  Marleni Syed  :  [id number]  Medication Name:    venlafaxine XR (Effexor-XR) 150 mg 24 hr capsule       Take 1 capsule (150 mg) by mouth once daily. Take with food       Specific Pharmacy location:  [facility]  Date of last appointment:    Date of next appointment:    Best number to reach patient:  [phone]

## 2024-05-30 ENCOUNTER — APPOINTMENT (OUTPATIENT)
Dept: PRIMARY CARE | Facility: CLINIC | Age: 39
End: 2024-05-30
Payer: MEDICAID

## 2024-06-24 ENCOUNTER — TELEPHONE (OUTPATIENT)
Dept: PRIMARY CARE | Facility: CLINIC | Age: 39
End: 2024-06-24
Payer: MEDICAID

## 2024-06-24 DIAGNOSIS — F32.0 DEPRESSION, MAJOR, SINGLE EPISODE, MILD (CMS-HCC): ICD-10-CM

## 2024-06-24 RX ORDER — VENLAFAXINE HYDROCHLORIDE 150 MG/1
150 CAPSULE, EXTENDED RELEASE ORAL DAILY
Qty: 30 CAPSULE | Refills: 0 | Status: SHIPPED | OUTPATIENT
Start: 2024-06-24 | End: 2024-07-24

## 2024-06-24 NOTE — TELEPHONE ENCOUNTER
Rx Refill Request Telephone Encounter    Name:  Marleni Syed  :  [id number]  Medication Name:        venlafaxine XR (Effexor-XR) 150 mg 24 hr capsule [[id number]]   take 1 cap once daily                Specific Pharmacy location:  [facility]  Date of last appointment:  2024  Date of next appointment:  2024  Best number to reach patient:  [phone]

## 2024-06-26 ENCOUNTER — APPOINTMENT (OUTPATIENT)
Dept: PRIMARY CARE | Facility: CLINIC | Age: 39
End: 2024-06-26
Payer: MEDICAID

## 2024-07-25 ENCOUNTER — APPOINTMENT (OUTPATIENT)
Dept: PRIMARY CARE | Facility: CLINIC | Age: 39
End: 2024-07-25
Payer: MEDICAID

## 2024-07-26 ENCOUNTER — TELEMEDICINE (OUTPATIENT)
Dept: PRIMARY CARE | Facility: CLINIC | Age: 39
End: 2024-07-26
Payer: MEDICAID

## 2024-07-29 ENCOUNTER — TELEPHONE (OUTPATIENT)
Dept: PRIMARY CARE | Facility: CLINIC | Age: 39
End: 2024-07-29
Payer: MEDICAID

## 2024-07-29 DIAGNOSIS — F32.0 DEPRESSION, MAJOR, SINGLE EPISODE, MILD (CMS-HCC): ICD-10-CM

## 2024-07-29 RX ORDER — VENLAFAXINE HYDROCHLORIDE 150 MG/1
150 CAPSULE, EXTENDED RELEASE ORAL DAILY
Qty: 30 CAPSULE | Refills: 0 | Status: SHIPPED | OUTPATIENT
Start: 2024-07-29 | End: 2024-08-28

## 2024-07-29 NOTE — TELEPHONE ENCOUNTER
Rx Refill Request Telephone Encounter    Name:  Marleni Syed  :  [id number]  Medication Name:    venlafaxine XR (Effexor-XR) 150 mg 24 hr capsule   Patient takes 1 tablet daily        Specific Pharmacy location:  [facility]  Date of last appointment:  24  Date of next appointment:  24  Best number to reach patient:

## 2024-08-29 ENCOUNTER — TELEPHONE (OUTPATIENT)
Dept: PRIMARY CARE | Facility: CLINIC | Age: 39
End: 2024-08-29

## 2024-08-29 ENCOUNTER — APPOINTMENT (OUTPATIENT)
Dept: PRIMARY CARE | Facility: CLINIC | Age: 39
End: 2024-08-29
Payer: MEDICAID

## 2024-09-03 ENCOUNTER — APPOINTMENT (OUTPATIENT)
Dept: PRIMARY CARE | Facility: CLINIC | Age: 39
End: 2024-09-03
Payer: MEDICAID

## 2024-09-03 DIAGNOSIS — C53.0 MALIGNANT NEOPLASM OF ENDOCERVIX (MULTI): ICD-10-CM

## 2024-09-03 DIAGNOSIS — F11.20 OPIOID DEPENDENCE ON MAINTENANCE AGONIST THERAPY, NO SYMPTOMS (MULTI): ICD-10-CM

## 2024-09-03 DIAGNOSIS — F51.05 INSOMNIA DUE TO OTHER MENTAL DISORDER: ICD-10-CM

## 2024-09-03 DIAGNOSIS — E03.9 ACQUIRED HYPOTHYROIDISM: ICD-10-CM

## 2024-09-03 DIAGNOSIS — D50.0 ANEMIA DUE TO BLOOD LOSS: ICD-10-CM

## 2024-09-03 DIAGNOSIS — F32.0 DEPRESSION, MAJOR, SINGLE EPISODE, MILD (CMS-HCC): Primary | ICD-10-CM

## 2024-09-03 DIAGNOSIS — G40.909 NONINTRACTABLE EPILEPSY WITHOUT STATUS EPILEPTICUS, UNSPECIFIED EPILEPSY TYPE (MULTI): ICD-10-CM

## 2024-09-03 DIAGNOSIS — F41.1 GENERALIZED ANXIETY DISORDER: ICD-10-CM

## 2024-09-03 DIAGNOSIS — F99 INSOMNIA DUE TO OTHER MENTAL DISORDER: ICD-10-CM

## 2024-09-03 DIAGNOSIS — R73.09 ELEVATED HEMOGLOBIN A1C: ICD-10-CM

## 2024-09-03 PROBLEM — R63.5 WEIGHT GAIN: Status: RESOLVED | Noted: 2023-04-03 | Resolved: 2024-09-03

## 2024-09-03 PROBLEM — R10.30 LOWER ABDOMINAL PAIN: Status: RESOLVED | Noted: 2023-10-05 | Resolved: 2024-09-03

## 2024-09-03 PROCEDURE — 99214 OFFICE O/P EST MOD 30 MIN: CPT | Performed by: FAMILY MEDICINE

## 2024-09-03 PROCEDURE — 1036F TOBACCO NON-USER: CPT | Performed by: FAMILY MEDICINE

## 2024-09-03 RX ORDER — BUSPIRONE HYDROCHLORIDE 15 MG/1
15 TABLET ORAL 2 TIMES DAILY
Qty: 180 TABLET | Refills: 1 | Status: SHIPPED | OUTPATIENT
Start: 2024-09-03 | End: 2025-03-02

## 2024-09-03 RX ORDER — VENLAFAXINE HYDROCHLORIDE 150 MG/1
150 CAPSULE, EXTENDED RELEASE ORAL DAILY
Qty: 90 CAPSULE | Refills: 3 | Status: SHIPPED | OUTPATIENT
Start: 2024-09-03 | End: 2025-09-03

## 2024-09-03 RX ORDER — MIRTAZAPINE 30 MG/1
30 TABLET, FILM COATED ORAL NIGHTLY
Qty: 90 TABLET | Refills: 3 | Status: SHIPPED | OUTPATIENT
Start: 2024-09-03 | End: 2025-09-03

## 2024-09-03 ASSESSMENT — ENCOUNTER SYMPTOMS
POLYDIPSIA: 0
DIZZINESS: 0
LIGHT-HEADEDNESS: 0
SLEEP DISTURBANCE: 0
DECREASED CONCENTRATION: 0
UNEXPECTED WEIGHT CHANGE: 0
APPETITE CHANGE: 0
NAUSEA: 0
HYPERACTIVE: 0
FATIGUE: 1
DEPRESSION: 1
VOMITING: 0
POLYPHAGIA: 0
HALLUCINATIONS: 0
DYSPHORIC MOOD: 0

## 2024-09-03 NOTE — ASSESSMENT & PLAN NOTE
Doing better with Venlafaxine 150 mg daily. Mirtazapine helps a lot but she is very tired all day on it. Takes late at night. Will try taking earlier in the evening.

## 2024-09-03 NOTE — ASSESSMENT & PLAN NOTE
Feeling well on Vnlafaxine. Using Buspirone 1.5 tabs 1-2 times a day during week. Taking Mirtazapine weekend only. Encouraged to adjust how take Mirtazapine so can use it daily. Recheck 4 months.

## 2024-09-03 NOTE — PATIENT INSTRUCTIONS
You have referral to neurology.     Have fasting labs done. If they are ok, will continue same medication. If not, will need to adjust    Take Mirtazapine earlier in the evening.     Schedule a follow up in 4 month if all is ok on labs.

## 2024-09-03 NOTE — PROGRESS NOTES
Subjective   Patient ID: Marleni Syed is a 39 y.o. female who presents for Depression.    Here to Follow up on Depression and SREKEANTH.     On Venlafaxine 150 mg daily and Mirtazapine 30 mg daily. Sometimes hard to wake up in am. Only taking Mirtazapine on weekends and using buspirone on weekends. She finds she gets to sleep faster after Mirtazapine. Does not take until going to bed, which is usually late.     Still tired all the time, Still cold oall the time. Almost out of levothyroxine.     DepressionPatient is not experiencing: decreased concentration and suicidal ideas.             Current Outpatient Medications:     buprenorphine (Subtex) 8 mg, Take 1 tab SL 6 days per week then 3/4 1 day per week, Disp: , Rfl:     busPIRone (Buspar) 15 mg tablet, Take 1 tablet (15 mg) by mouth 3 times a day., Disp: 270 tablet, Rfl: 0    levothyroxine (Synthroid, Levoxyl) 50 mcg tablet, Take 1 tablet (50 mcg) by mouth once daily in the morning. Take before meals., Disp: 90 tablet, Rfl: 3    mirtazapine (Remeron) 30 mg tablet, Take 1 tablet (30 mg) by mouth once daily at bedtime., Disp: 30 tablet, Rfl: 2    ondansetron (Zofran) 4 mg tablet, Take 1 tablet (4 mg) by mouth every 8 hours if needed for vomiting or nausea., Disp: , Rfl:     venlafaxine XR (Effexor-XR) 150 mg 24 hr capsule, Take 1 capsule (150 mg) by mouth once daily. Take with food., Disp: 30 capsule, Rfl: 0    Patient Active Problem List   Diagnosis    Generalized anxiety disorder    Depression, major, single episode, mild (CMS-HCC)    Insomnia due to other mental disorder    Former smoker    Opioid dependence on maintenance agonist therapy, no symptoms (Multi)    Pain, joint, multiple sites    Squamous cell carcinoma of cervix, stage 1 (Multi)    Other fatigue    Elevated hemoglobin A1c    Acquired hypothyroidism    Anemia due to blood loss    Nonintractable epilepsy without status epilepticus, unspecified epilepsy type (Multi)         Review of Systems    Constitutional:  Positive for fatigue. Negative for appetite change and unexpected weight change.   Gastrointestinal:  Negative for nausea and vomiting.   Endocrine: Positive for cold intolerance. Negative for polydipsia, polyphagia and polyuria.   Neurological:  Negative for dizziness and light-headedness.   Psychiatric/Behavioral:  Positive for depression. Negative for decreased concentration, dysphoric mood, hallucinations, sleep disturbance and suicidal ideas. The patient is not hyperactive.         Anxiety waxes and wantes, uses prn Buspirone with success       Objective   There were no vitals taken for this visit.    Physical Exam  Constitutional:       Appearance: Normal appearance.      Comments: Alert, relaxed.    Pulmonary:      Effort: Pulmonary effort is normal.   Neurological:      Mental Status: She is alert.   Psychiatric:         Mood and Affect: Mood normal.         Behavior: Behavior normal.         Thought Content: Thought content normal.         Assessment/Plan   Problem List Items Addressed This Visit       Generalized anxiety disorder     Feeling well on Vnlafaxine. Using Buspirone 1.5 tabs 1-2 times a day during week. Taking Mirtazapine weekend only. Encouraged to adjust how take Mirtazapine so can use it daily. Recheck 4 months.          Depression, major, single episode, mild (CMS-HCC)     Doing better with Venlafaxine 150 mg daily. Mirtazapine helps a lot but she is very tired all day on it. Takes late at night. Will try taking earlier in the evening.          Insomnia due to other mental disorder     Sleep much better on Mirtazapine and Buspirone. Continue for now.          Opioid dependence on maintenance agonist therapy, no symptoms (Multi)     Getting MAT, remains abstinent.          Elevated hemoglobin A1c     Repeat ordered. Had come down         Acquired hypothyroidism - Primary     Will recheck TFTs, having symptoms.          Relevant Orders    Thyroid Stimulating Hormone     Thyroxine, Free    Anemia due to blood loss     On iron and due for labs soon. She will get in next couple weeks.          Nonintractable epilepsy without status epilepticus, unspecified epilepsy type (Multi)     Lost to neurologic follow up. Did not take her seizure medidicne due to advice from her mothe-in-law. Had seizure couple weeks ago after none for a year. Discussed issues with driving. Referred to neurology. Discussed importance of adequate treatment.           Other Visit Diagnoses       Malignant neoplasm of endocervix (Multi)        Had surgery. MRI is scheduled on October 17.              Assessment, plans, tests, and follow up discussed with patient and patient verbalized understanding. Marleni was given an opportunity to ask questions and  any concerns were addressed including but not limited to medications, labs due, follow up. And need to schedule neurology referral. .

## 2024-09-03 NOTE — ASSESSMENT & PLAN NOTE
Lost to neurologic follow up. Did not take her seizure medidicne due to advice from her mothe-in-law. Had seizure couple weeks ago after none for a year. Discussed issues with driving. Referred to neurology. Discussed importance of adequate treatment.

## 2024-10-16 ENCOUNTER — ANESTHESIA EVENT (OUTPATIENT)
Dept: RADIOLOGY | Facility: HOSPITAL | Age: 39
End: 2024-10-16

## 2024-10-17 ENCOUNTER — ANESTHESIA (OUTPATIENT)
Dept: RADIOLOGY | Facility: HOSPITAL | Age: 39
End: 2024-10-17
Payer: MEDICAID

## 2024-12-23 ENCOUNTER — TELEPHONE (OUTPATIENT)
Dept: GYNECOLOGIC ONCOLOGY | Facility: HOSPITAL | Age: 39
End: 2024-12-23
Payer: MEDICAID

## 2024-12-23 NOTE — TELEPHONE ENCOUNTER
Kourtney called and reported post intercourse spotting. She denies any pain or discomfort before, during or after intercourse. She states that due to family issues she has not been able to go to previously scheduled MRIs. She scheduled one for February but wanted to know if she should be seen before/after the MRI.

## 2024-12-24 ENCOUNTER — ANESTHESIA EVENT (OUTPATIENT)
Dept: RADIOLOGY | Facility: HOSPITAL | Age: 39
End: 2024-12-24
Payer: MEDICAID

## 2024-12-26 ENCOUNTER — HOSPITAL ENCOUNTER (OUTPATIENT)
Dept: RADIOLOGY | Facility: HOSPITAL | Age: 39
Discharge: HOME | End: 2024-12-26
Payer: MEDICAID

## 2024-12-26 ENCOUNTER — ANESTHESIA (OUTPATIENT)
Dept: RADIOLOGY | Facility: HOSPITAL | Age: 39
End: 2024-12-26
Payer: MEDICAID

## 2024-12-26 VITALS
HEART RATE: 65 BPM | TEMPERATURE: 97.5 F | RESPIRATION RATE: 13 BRPM | SYSTOLIC BLOOD PRESSURE: 126 MMHG | OXYGEN SATURATION: 100 % | DIASTOLIC BLOOD PRESSURE: 83 MMHG

## 2024-12-26 DIAGNOSIS — C53.9: ICD-10-CM

## 2024-12-26 PROCEDURE — 7100000009 HC PHASE TWO TIME - INITIAL BASE CHARGE

## 2024-12-26 PROCEDURE — A72197 CHG MRI, PELVIS, COMBO

## 2024-12-26 PROCEDURE — 7100000001 HC RECOVERY ROOM TIME - INITIAL BASE CHARGE

## 2024-12-26 PROCEDURE — 2500000004 HC RX 250 GENERAL PHARMACY W/ HCPCS (ALT 636 FOR OP/ED): Mod: SE

## 2024-12-26 PROCEDURE — A72197 CHG MRI, PELVIS, COMBO: Performed by: ANESTHESIOLOGY

## 2024-12-26 PROCEDURE — 72197 MRI PELVIS W/O & W/DYE: CPT

## 2024-12-26 PROCEDURE — 2500000005 HC RX 250 GENERAL PHARMACY W/O HCPCS: Mod: SE

## 2024-12-26 PROCEDURE — A9575 INJ GADOTERATE MEGLUMI 0.1ML: HCPCS | Mod: SE | Performed by: STUDENT IN AN ORGANIZED HEALTH CARE EDUCATION/TRAINING PROGRAM

## 2024-12-26 PROCEDURE — 72197 MRI PELVIS W/O & W/DYE: CPT | Performed by: RADIOLOGY

## 2024-12-26 PROCEDURE — 7100000010 HC PHASE TWO TIME - EACH INCREMENTAL 1 MINUTE

## 2024-12-26 PROCEDURE — 2500000001 HC RX 250 WO HCPCS SELF ADMINISTERED DRUGS (ALT 637 FOR MEDICARE OP): Mod: SE | Performed by: ANESTHESIOLOGY

## 2024-12-26 PROCEDURE — 7100000002 HC RECOVERY ROOM TIME - EACH INCREMENTAL 1 MINUTE

## 2024-12-26 PROCEDURE — 3700000001 HC GENERAL ANESTHESIA TIME - INITIAL BASE CHARGE

## 2024-12-26 PROCEDURE — 2550000001 HC RX 255 CONTRASTS: Mod: SE | Performed by: STUDENT IN AN ORGANIZED HEALTH CARE EDUCATION/TRAINING PROGRAM

## 2024-12-26 PROCEDURE — 3700000002 HC GENERAL ANESTHESIA TIME - EACH INCREMENTAL 1 MINUTE

## 2024-12-26 RX ORDER — GLYCOPYRROLATE 0.2 MG/ML
INJECTION INTRAMUSCULAR; INTRAVENOUS
Status: DISPENSED
Start: 2024-12-26 | End: 2024-12-26

## 2024-12-26 RX ORDER — ALBUTEROL SULFATE 0.83 MG/ML
2.5 SOLUTION RESPIRATORY (INHALATION) ONCE AS NEEDED
Status: DISCONTINUED | OUTPATIENT
Start: 2024-12-26 | End: 2024-12-27 | Stop reason: HOSPADM

## 2024-12-26 RX ORDER — MIDAZOLAM HYDROCHLORIDE 1 MG/ML
INJECTION INTRAMUSCULAR; INTRAVENOUS AS NEEDED
Status: DISCONTINUED | OUTPATIENT
Start: 2024-12-26 | End: 2024-12-26

## 2024-12-26 RX ORDER — DROPERIDOL 2.5 MG/ML
0.62 INJECTION, SOLUTION INTRAMUSCULAR; INTRAVENOUS ONCE AS NEEDED
Status: DISCONTINUED | OUTPATIENT
Start: 2024-12-26 | End: 2024-12-27 | Stop reason: HOSPADM

## 2024-12-26 RX ORDER — ACETAMINOPHEN 325 MG/1
650 TABLET ORAL EVERY 4 HOURS PRN
Status: DISCONTINUED | OUTPATIENT
Start: 2024-12-26 | End: 2024-12-27 | Stop reason: HOSPADM

## 2024-12-26 RX ORDER — LIDOCAINE HYDROCHLORIDE 40 MG/ML
SOLUTION TOPICAL
Status: COMPLETED
Start: 2024-12-26 | End: 2024-12-26

## 2024-12-26 RX ORDER — PHENYLEPHRINE HCL IN 0.9% NACL 0.4MG/10ML
SYRINGE (ML) INTRAVENOUS
Status: DISPENSED
Start: 2024-12-26 | End: 2024-12-26

## 2024-12-26 RX ORDER — OXYCODONE HYDROCHLORIDE 5 MG/1
5 TABLET ORAL EVERY 4 HOURS PRN
Status: DISCONTINUED | OUTPATIENT
Start: 2024-12-26 | End: 2024-12-27 | Stop reason: HOSPADM

## 2024-12-26 RX ORDER — DIPHENHYDRAMINE HYDROCHLORIDE 50 MG/ML
12.5 INJECTION INTRAMUSCULAR; INTRAVENOUS ONCE AS NEEDED
Status: DISCONTINUED | OUTPATIENT
Start: 2024-12-26 | End: 2024-12-27 | Stop reason: HOSPADM

## 2024-12-26 RX ORDER — GLYCOPYRROLATE 0.2 MG/ML
INJECTION INTRAMUSCULAR; INTRAVENOUS AS NEEDED
Status: DISCONTINUED | OUTPATIENT
Start: 2024-12-26 | End: 2024-12-26

## 2024-12-26 RX ORDER — MIDAZOLAM HYDROCHLORIDE 1 MG/ML
0.5 INJECTION INTRAMUSCULAR; INTRAVENOUS
Status: DISCONTINUED | OUTPATIENT
Start: 2024-12-26 | End: 2024-12-27 | Stop reason: HOSPADM

## 2024-12-26 RX ORDER — PHENYLEPHRINE HCL IN 0.9% NACL 0.4MG/10ML
SYRINGE (ML) INTRAVENOUS AS NEEDED
Status: DISCONTINUED | OUTPATIENT
Start: 2024-12-26 | End: 2024-12-26

## 2024-12-26 RX ORDER — ONDANSETRON HYDROCHLORIDE 2 MG/ML
4 INJECTION, SOLUTION INTRAVENOUS ONCE AS NEEDED
Status: DISCONTINUED | OUTPATIENT
Start: 2024-12-26 | End: 2024-12-27 | Stop reason: HOSPADM

## 2024-12-26 RX ORDER — LIDOCAINE HYDROCHLORIDE 40 MG/ML
SOLUTION TOPICAL AS NEEDED
Status: DISCONTINUED | OUTPATIENT
Start: 2024-12-26 | End: 2024-12-26

## 2024-12-26 RX ORDER — HYDROMORPHONE HYDROCHLORIDE 0.2 MG/ML
0.2 INJECTION INTRAMUSCULAR; INTRAVENOUS; SUBCUTANEOUS EVERY 5 MIN PRN
Status: DISCONTINUED | OUTPATIENT
Start: 2024-12-26 | End: 2024-12-27 | Stop reason: HOSPADM

## 2024-12-26 RX ORDER — FENTANYL CITRATE 50 UG/ML
INJECTION, SOLUTION INTRAMUSCULAR; INTRAVENOUS
Status: COMPLETED
Start: 2024-12-26 | End: 2024-12-26

## 2024-12-26 RX ORDER — FENTANYL CITRATE 50 UG/ML
INJECTION, SOLUTION INTRAMUSCULAR; INTRAVENOUS AS NEEDED
Status: DISCONTINUED | OUTPATIENT
Start: 2024-12-26 | End: 2024-12-26

## 2024-12-26 RX ORDER — LIDOCAINE HYDROCHLORIDE 10 MG/ML
0.1 INJECTION, SOLUTION INFILTRATION; PERINEURAL ONCE
Status: DISCONTINUED | OUTPATIENT
Start: 2024-12-26 | End: 2024-12-27 | Stop reason: HOSPADM

## 2024-12-26 RX ORDER — GADOTERATE MEGLUMINE 376.9 MG/ML
15 INJECTION INTRAVENOUS
Status: COMPLETED | OUTPATIENT
Start: 2024-12-26 | End: 2024-12-26

## 2024-12-26 RX ORDER — ONDANSETRON HYDROCHLORIDE 2 MG/ML
INJECTION, SOLUTION INTRAVENOUS AS NEEDED
Status: DISCONTINUED | OUTPATIENT
Start: 2024-12-26 | End: 2024-12-26

## 2024-12-26 RX ORDER — SODIUM CHLORIDE, SODIUM LACTATE, POTASSIUM CHLORIDE, CALCIUM CHLORIDE 600; 310; 30; 20 MG/100ML; MG/100ML; MG/100ML; MG/100ML
INJECTION, SOLUTION INTRAVENOUS
Status: COMPLETED
Start: 2024-12-26 | End: 2024-12-26

## 2024-12-26 RX ORDER — ONDANSETRON HYDROCHLORIDE 2 MG/ML
INJECTION, SOLUTION INTRAVENOUS
Status: COMPLETED
Start: 2024-12-26 | End: 2024-12-26

## 2024-12-26 RX ORDER — PROPOFOL 10 MG/ML
INJECTION, EMULSION INTRAVENOUS
Status: COMPLETED
Start: 2024-12-26 | End: 2024-12-26

## 2024-12-26 RX ORDER — METHOCARBAMOL 100 MG/ML
1000 INJECTION, SOLUTION INTRAMUSCULAR; INTRAVENOUS ONCE
Status: DISCONTINUED | OUTPATIENT
Start: 2024-12-26 | End: 2024-12-27 | Stop reason: HOSPADM

## 2024-12-26 RX ORDER — SEVOFLURANE 250 ML/250ML
LIQUID RESPIRATORY (INHALATION)
Status: DISPENSED
Start: 2024-12-26 | End: 2024-12-26

## 2024-12-26 RX ORDER — LABETALOL HYDROCHLORIDE 5 MG/ML
5 INJECTION, SOLUTION INTRAVENOUS ONCE AS NEEDED
Status: DISCONTINUED | OUTPATIENT
Start: 2024-12-26 | End: 2024-12-27 | Stop reason: HOSPADM

## 2024-12-26 RX ORDER — MIDAZOLAM HYDROCHLORIDE 1 MG/ML
INJECTION INTRAMUSCULAR; INTRAVENOUS
Status: COMPLETED
Start: 2024-12-26 | End: 2024-12-26

## 2024-12-26 RX ORDER — HYDRALAZINE HYDROCHLORIDE 20 MG/ML
5 INJECTION INTRAMUSCULAR; INTRAVENOUS EVERY 30 MIN PRN
Status: DISCONTINUED | OUTPATIENT
Start: 2024-12-26 | End: 2024-12-27 | Stop reason: HOSPADM

## 2024-12-26 RX ORDER — PROPOFOL 10 MG/ML
INJECTION, EMULSION INTRAVENOUS AS NEEDED
Status: DISCONTINUED | OUTPATIENT
Start: 2024-12-26 | End: 2024-12-26

## 2024-12-26 RX ORDER — ROCURONIUM BROMIDE 10 MG/ML
INJECTION, SOLUTION INTRAVENOUS AS NEEDED
Status: DISCONTINUED | OUTPATIENT
Start: 2024-12-26 | End: 2024-12-26

## 2024-12-26 RX ORDER — LIDOCAINE HYDROCHLORIDE 20 MG/ML
INJECTION, SOLUTION EPIDURAL; INFILTRATION; INTRACAUDAL; PERINEURAL
Status: DISPENSED
Start: 2024-12-26 | End: 2024-12-26

## 2024-12-26 RX ORDER — LIDOCAINE HYDROCHLORIDE 20 MG/ML
INJECTION, SOLUTION INFILTRATION; PERINEURAL AS NEEDED
Status: DISCONTINUED | OUTPATIENT
Start: 2024-12-26 | End: 2024-12-26

## 2024-12-26 SDOH — HEALTH STABILITY: MENTAL HEALTH: CURRENT SMOKER: 1

## 2024-12-26 ASSESSMENT — PAIN SCALES - GENERAL
PAINLEVEL_OUTOF10: 2

## 2024-12-26 ASSESSMENT — PAIN - FUNCTIONAL ASSESSMENT
PAIN_FUNCTIONAL_ASSESSMENT: 0-10

## 2024-12-26 NOTE — ANESTHESIA POSTPROCEDURE EVALUATION
Patient: Marleni Syed    Procedure Summary       Date: 12/26/24 Room / Location: [facility]    Anesthesia Start: 1022 Anesthesia Stop: 1212    Procedure: MR PELVIS W AND WO CONTRAST Diagnosis:       Squamous cell carcinoma of cervix, stage 1 (Multi)      (History of radical trachelectomy for cervical cancer, surveillance)    Scheduled Providers: Joy Odonnell MD; VANESSA Parker-CRNA Responsible Provider: Joy Odonnell MD    Anesthesia Type: general ASA Status: 3            Anesthesia Type: general    Vitals Value Taken Time   /80 12/26/24 1245   Temp 36 °C (96.8 °F) 12/26/24 1245   Pulse 63 12/26/24 1245   Resp 11 12/26/24 1245   SpO2 97 % 12/26/24 1245       Anesthesia Post Evaluation    Patient location during evaluation: PACU  Patient participation: complete - patient participated  Level of consciousness: sleepy but conscious  Pain management: adequate  Airway patency: patent  Cardiovascular status: acceptable  Respiratory status: acceptable  Hydration status: acceptable  Postoperative Nausea and Vomiting: none    There were no known notable events for this encounter.

## 2024-12-26 NOTE — ANESTHESIA PREPROCEDURE EVALUATION
Patient: Marleni Syed    Procedure Information       Anesthesia Start Date/Time: 12/26/24 1022    Scheduled providers: Joy Odonnell MD; VANESSA Parker-CRNA    Procedure: MR PELVIS W AND WO CONTRAST    Location: [facility]            Relevant Problems   Neuro   (+) Depression, major, single episode, mild (CMS-HCC)   (+) Generalized anxiety disorder   (+) Nonintractable epilepsy without status epilepticus, unspecified epilepsy type (Multi)      Endocrine   (+) Acquired hypothyroidism      Hematology   (+) Anemia due to blood loss      GYN   (+) Squamous cell carcinoma of cervix, stage 1 (Multi)       Clinical information reviewed:                   NPO Detail:  No data recorded     Physical Exam    Airway  Mallampati: I  TM distance: >3 FB  Neck ROM: full     Cardiovascular   Rhythm: regular  Rate: normal     Dental    Pulmonary   Breath sounds clear to auscultation     Abdominal - normal exam         Anesthesia Plan    History of general anesthesia?: yes  History of complications of general anesthesia?: no    ASA 3     general     The patient is a current smoker.  Patient was previously instructed to abstain from smoking on day of procedure.  Patient smoked on day of procedure.    intravenous induction   Trial extubation is planned.  Anesthetic plan and risks discussed with patient.  Use of blood products discussed with patient who consented to blood products.    Plan discussed with CRNA and attending.

## 2024-12-26 NOTE — ANESTHESIA PROCEDURE NOTES
Airway  Date/Time: 12/26/2024 10:42 AM  Urgency: elective    Airway not difficult    Staffing  Performed: CRNA   Authorized by: Joy Odonnell MD    Performed by: PIERCE Parker  Patient location during procedure: OR    Indications and Patient Condition  Indications for airway management: anesthesia and airway protection  Spontaneous Ventilation: absent  Sedation level: deep  Preoxygenated: yes  Patient position: sniffing  Mask difficulty assessment: 1 - vent by mask  Planned trial extubation    Final Airway Details  Final airway type: endotracheal airway      Successful airway: ETT  Cuffed: yes   Successful intubation technique: direct laryngoscopy  Facilitating devices/methods: intubating stylet and anterior pressure/BURP  Endotracheal tube insertion site: oral  Blade: Violette  Blade size: #3  ETT size (mm): 7.0  Cormack-Lehane Classification: grade I - full view of glottis  Placement verified by: chest auscultation and capnometry   Measured from: lips  ETT to lips (cm): 22  Number of attempts at approach: 1  Ventilation between attempts: none  Number of other approaches attempted: 0

## 2025-02-20 ENCOUNTER — APPOINTMENT (OUTPATIENT)
Dept: RADIOLOGY | Facility: HOSPITAL | Age: 40
End: 2025-02-20
Payer: MEDICAID

## 2025-03-18 ENCOUNTER — APPOINTMENT (OUTPATIENT)
Dept: PRIMARY CARE | Facility: CLINIC | Age: 40
End: 2025-03-18
Payer: MEDICAID

## 2025-03-25 ENCOUNTER — APPOINTMENT (OUTPATIENT)
Dept: PRIMARY CARE | Facility: CLINIC | Age: 40
End: 2025-03-25
Payer: MEDICAID

## 2025-04-04 ENCOUNTER — DOCUMENTATION (OUTPATIENT)
Dept: URGENT CARE | Age: 40
End: 2025-04-04

## 2025-04-10 ENCOUNTER — APPOINTMENT (OUTPATIENT)
Dept: PRIMARY CARE | Facility: CLINIC | Age: 40
End: 2025-04-10
Payer: MEDICAID

## (undated) DEVICE — Device

## (undated) DEVICE — DRAPE, UNDERBUTTOCKS

## (undated) DEVICE — TIP, SUCTION, YANKAUER, FLEXIBLE

## (undated) DEVICE — TUBING, SUCTION, CONNECTING, STERILE 0.25 X 120 IN., LF

## (undated) DEVICE — COVER, TABLE, 44 X 75 IN, DISPOSABLE, LF, STERILE

## (undated) DEVICE — DRAPE, LEGGINGS, 48 X 31 IN, STERILE, LF

## (undated) DEVICE — GOWN, SURGICAL, SMARTGOWN, XLARGE, STERILE